# Patient Record
Sex: FEMALE | Race: ASIAN | Employment: UNEMPLOYED | URBAN - METROPOLITAN AREA
[De-identification: names, ages, dates, MRNs, and addresses within clinical notes are randomized per-mention and may not be internally consistent; named-entity substitution may affect disease eponyms.]

---

## 2022-08-20 ENCOUNTER — APPOINTMENT (OUTPATIENT)
Dept: GENERAL RADIOLOGY | Age: 82
DRG: 480 | End: 2022-08-20
Attending: INTERNAL MEDICINE
Payer: COMMERCIAL

## 2022-08-20 ENCOUNTER — HOSPITAL ENCOUNTER (INPATIENT)
Age: 82
LOS: 11 days | Discharge: NURSING, REHAB OR LONG-TERM CARE | DRG: 480 | End: 2022-08-31
Attending: INTERNAL MEDICINE | Admitting: STUDENT IN AN ORGANIZED HEALTH CARE EDUCATION/TRAINING PROGRAM
Payer: COMMERCIAL

## 2022-08-20 DIAGNOSIS — M84.353A FEMORAL NECK STRESS FRACTURE, INITIAL ENCOUNTER: Primary | ICD-10-CM

## 2022-08-20 PROBLEM — S72.002A CLOSED DISPLACED FRACTURE OF LEFT FEMORAL NECK (HCC): Status: ACTIVE | Noted: 2022-08-20

## 2022-08-20 PROBLEM — U07.1 COVID-19: Status: ACTIVE | Noted: 2022-08-20

## 2022-08-20 LAB
ALBUMIN SERPL-MCNC: 4.1 GM/DL (ref 3.4–5)
ALP BLD-CCNC: 82 IU/L (ref 40–129)
ALT SERPL-CCNC: 18 U/L (ref 10–40)
ANION GAP SERPL CALCULATED.3IONS-SCNC: 10 MMOL/L (ref 4–16)
APTT: 37.6 SECONDS (ref 25.1–37.1)
AST SERPL-CCNC: 13 IU/L (ref 15–37)
BASOPHILS ABSOLUTE: 0 K/CU MM
BASOPHILS RELATIVE PERCENT: 0.3 % (ref 0–1)
BILIRUB SERPL-MCNC: 0.5 MG/DL (ref 0–1)
BUN BLDV-MCNC: 19 MG/DL (ref 6–23)
CALCIUM SERPL-MCNC: 10.2 MG/DL (ref 8.3–10.6)
CHLORIDE BLD-SCNC: 94 MMOL/L (ref 99–110)
CO2: 25 MMOL/L (ref 21–32)
CREAT SERPL-MCNC: 0.6 MG/DL (ref 0.6–1.1)
D DIMER: 2223 NG/ML(DDU)
DIFFERENTIAL TYPE: ABNORMAL
EOSINOPHILS ABSOLUTE: 0 K/CU MM
EOSINOPHILS RELATIVE PERCENT: 0 % (ref 0–3)
GFR AFRICAN AMERICAN: >60 ML/MIN/1.73M2
GFR NON-AFRICAN AMERICAN: >60 ML/MIN/1.73M2
GLUCOSE BLD-MCNC: 336 MG/DL (ref 70–99)
GLUCOSE BLD-MCNC: 350 MG/DL (ref 70–99)
HCT VFR BLD CALC: 31.1 % (ref 37–47)
HEMOGLOBIN: 10 GM/DL (ref 12.5–16)
IMMATURE NEUTROPHIL %: 0.3 % (ref 0–0.43)
INR BLD: 0.99 INDEX
LACTATE DEHYDROGENASE: 130 IU/L (ref 120–246)
LYMPHOCYTES ABSOLUTE: 1.7 K/CU MM
LYMPHOCYTES RELATIVE PERCENT: 12.9 % (ref 24–44)
MCH RBC QN AUTO: 27.8 PG (ref 27–31)
MCHC RBC AUTO-ENTMCNC: 32.2 % (ref 32–36)
MCV RBC AUTO: 86.4 FL (ref 78–100)
MONOCYTES ABSOLUTE: 1.2 K/CU MM
MONOCYTES RELATIVE PERCENT: 8.9 % (ref 0–4)
NUCLEATED RBC %: 0 %
PDW BLD-RTO: 14.3 % (ref 11.7–14.9)
PLATELET # BLD: 249 K/CU MM (ref 140–440)
PMV BLD AUTO: 9.5 FL (ref 7.5–11.1)
POTASSIUM SERPL-SCNC: 4.4 MMOL/L (ref 3.5–5.1)
PROTHROMBIN TIME: 12.8 SECONDS (ref 11.7–14.5)
RBC # BLD: 3.6 M/CU MM (ref 4.2–5.4)
SEGMENTED NEUTROPHILS ABSOLUTE COUNT: 10.1 K/CU MM
SEGMENTED NEUTROPHILS RELATIVE PERCENT: 77.6 % (ref 36–66)
SODIUM BLD-SCNC: 129 MMOL/L (ref 135–145)
TOTAL IMMATURE NEUTOROPHIL: 0.04 K/CU MM
TOTAL NUCLEATED RBC: 0 K/CU MM
TOTAL PROTEIN: 6.6 GM/DL (ref 6.4–8.2)
VITAMIN D 25-HYDROXY: 27.31 NG/ML
WBC # BLD: 13.1 K/CU MM (ref 4–10.5)

## 2022-08-20 PROCEDURE — 6370000000 HC RX 637 (ALT 250 FOR IP): Performed by: NURSE PRACTITIONER

## 2022-08-20 PROCEDURE — 83615 LACTATE (LD) (LDH) ENZYME: CPT

## 2022-08-20 PROCEDURE — 85379 FIBRIN DEGRADATION QUANT: CPT

## 2022-08-20 PROCEDURE — 85025 COMPLETE CBC W/AUTO DIFF WBC: CPT

## 2022-08-20 PROCEDURE — 71045 X-RAY EXAM CHEST 1 VIEW: CPT

## 2022-08-20 PROCEDURE — 2580000003 HC RX 258: Performed by: NURSE PRACTITIONER

## 2022-08-20 PROCEDURE — 82306 VITAMIN D 25 HYDROXY: CPT

## 2022-08-20 PROCEDURE — 85730 THROMBOPLASTIN TIME PARTIAL: CPT

## 2022-08-20 PROCEDURE — 80053 COMPREHEN METABOLIC PANEL: CPT

## 2022-08-20 PROCEDURE — 85610 PROTHROMBIN TIME: CPT

## 2022-08-20 PROCEDURE — 6360000002 HC RX W HCPCS: Performed by: NURSE PRACTITIONER

## 2022-08-20 PROCEDURE — 82962 GLUCOSE BLOOD TEST: CPT

## 2022-08-20 PROCEDURE — 83036 HEMOGLOBIN GLYCOSYLATED A1C: CPT

## 2022-08-20 PROCEDURE — 36415 COLL VENOUS BLD VENIPUNCTURE: CPT

## 2022-08-20 PROCEDURE — 1200000000 HC SEMI PRIVATE

## 2022-08-20 RX ORDER — MORPHINE SULFATE 2 MG/ML
2 INJECTION, SOLUTION INTRAMUSCULAR; INTRAVENOUS
Status: DISCONTINUED | OUTPATIENT
Start: 2022-08-20 | End: 2022-08-21

## 2022-08-20 RX ORDER — SODIUM CHLORIDE 0.9 % (FLUSH) 0.9 %
5-40 SYRINGE (ML) INJECTION EVERY 12 HOURS SCHEDULED
Status: DISCONTINUED | OUTPATIENT
Start: 2022-08-20 | End: 2022-08-21

## 2022-08-20 RX ORDER — DEXTROSE AND SODIUM CHLORIDE 5; .45 G/100ML; G/100ML
INJECTION, SOLUTION INTRAVENOUS CONTINUOUS
Status: DISCONTINUED | OUTPATIENT
Start: 2022-08-20 | End: 2022-08-21

## 2022-08-20 RX ORDER — ONDANSETRON 2 MG/ML
4 INJECTION INTRAMUSCULAR; INTRAVENOUS EVERY 6 HOURS PRN
Status: DISCONTINUED | OUTPATIENT
Start: 2022-08-20 | End: 2022-08-21

## 2022-08-20 RX ORDER — INSULIN LISPRO 100 [IU]/ML
0-4 INJECTION, SOLUTION INTRAVENOUS; SUBCUTANEOUS NIGHTLY
Status: DISCONTINUED | OUTPATIENT
Start: 2022-08-20 | End: 2022-08-21

## 2022-08-20 RX ORDER — ENOXAPARIN SODIUM 100 MG/ML
30 INJECTION SUBCUTANEOUS DAILY
Status: DISCONTINUED | OUTPATIENT
Start: 2022-08-20 | End: 2022-08-20

## 2022-08-20 RX ORDER — SODIUM CHLORIDE 0.9 % (FLUSH) 0.9 %
5-40 SYRINGE (ML) INJECTION PRN
Status: DISCONTINUED | OUTPATIENT
Start: 2022-08-20 | End: 2022-08-31 | Stop reason: HOSPADM

## 2022-08-20 RX ORDER — ACETAMINOPHEN 650 MG/1
650 SUPPOSITORY RECTAL EVERY 6 HOURS PRN
Status: DISCONTINUED | OUTPATIENT
Start: 2022-08-20 | End: 2022-08-21

## 2022-08-20 RX ORDER — DEXTROSE MONOHYDRATE 100 MG/ML
INJECTION, SOLUTION INTRAVENOUS CONTINUOUS PRN
Status: DISCONTINUED | OUTPATIENT
Start: 2022-08-20 | End: 2022-08-31 | Stop reason: HOSPADM

## 2022-08-20 RX ORDER — METOPROLOL SUCCINATE 25 MG/1
25 TABLET, EXTENDED RELEASE ORAL DAILY
COMMUNITY

## 2022-08-20 RX ORDER — IBUPROFEN 400 MG/1
400 TABLET ORAL EVERY 12 HOURS PRN
Status: DISCONTINUED | OUTPATIENT
Start: 2022-08-20 | End: 2022-08-21

## 2022-08-20 RX ORDER — PROMETHAZINE HYDROCHLORIDE 25 MG/1
12.5 TABLET ORAL EVERY 6 HOURS PRN
Status: DISCONTINUED | OUTPATIENT
Start: 2022-08-20 | End: 2022-08-21

## 2022-08-20 RX ORDER — INSULIN LISPRO 100 [IU]/ML
0-4 INJECTION, SOLUTION INTRAVENOUS; SUBCUTANEOUS
Status: DISCONTINUED | OUTPATIENT
Start: 2022-08-20 | End: 2022-08-21

## 2022-08-20 RX ORDER — ACETAMINOPHEN 325 MG/1
650 TABLET ORAL EVERY 6 HOURS PRN
Status: DISCONTINUED | OUTPATIENT
Start: 2022-08-20 | End: 2022-08-21

## 2022-08-20 RX ORDER — GUAIFENESIN/DEXTROMETHORPHAN 100-10MG/5
5 SYRUP ORAL EVERY 4 HOURS PRN
Status: DISCONTINUED | OUTPATIENT
Start: 2022-08-20 | End: 2022-08-31 | Stop reason: HOSPADM

## 2022-08-20 RX ORDER — METOPROLOL SUCCINATE 25 MG/1
25 TABLET, EXTENDED RELEASE ORAL EVERY EVENING
Status: DISCONTINUED | OUTPATIENT
Start: 2022-08-20 | End: 2022-08-31 | Stop reason: HOSPADM

## 2022-08-20 RX ORDER — POLYETHYLENE GLYCOL 3350 17 G/17G
17 POWDER, FOR SOLUTION ORAL DAILY PRN
Status: DISCONTINUED | OUTPATIENT
Start: 2022-08-20 | End: 2022-08-31 | Stop reason: HOSPADM

## 2022-08-20 RX ORDER — MORPHINE SULFATE 4 MG/ML
4 INJECTION, SOLUTION INTRAMUSCULAR; INTRAVENOUS
Status: DISCONTINUED | OUTPATIENT
Start: 2022-08-20 | End: 2022-08-21

## 2022-08-20 RX ORDER — LISINOPRIL 2.5 MG/1
5 TABLET ORAL 2 TIMES DAILY
Status: ON HOLD | COMMUNITY
End: 2022-08-31 | Stop reason: SDUPTHER

## 2022-08-20 RX ORDER — AMLODIPINE BESYLATE 5 MG/1
5 TABLET ORAL DAILY
Status: DISCONTINUED | OUTPATIENT
Start: 2022-08-21 | End: 2022-08-31 | Stop reason: HOSPADM

## 2022-08-20 RX ORDER — AMLODIPINE BESYLATE 5 MG/1
10 TABLET ORAL DAILY
Status: ON HOLD | COMMUNITY
End: 2022-08-31 | Stop reason: SDUPTHER

## 2022-08-20 RX ORDER — ENOXAPARIN SODIUM 100 MG/ML
30 INJECTION SUBCUTANEOUS DAILY
Status: DISCONTINUED | OUTPATIENT
Start: 2022-08-21 | End: 2022-08-21

## 2022-08-20 RX ORDER — SODIUM CHLORIDE 9 MG/ML
INJECTION, SOLUTION INTRAVENOUS PRN
Status: DISCONTINUED | OUTPATIENT
Start: 2022-08-20 | End: 2022-08-31 | Stop reason: HOSPADM

## 2022-08-20 RX ORDER — GLIPIZIDE 10 MG/1
60 TABLET ORAL
Status: ON HOLD | COMMUNITY
End: 2022-08-21 | Stop reason: CLARIF

## 2022-08-20 RX ORDER — LISINOPRIL 5 MG/1
5 TABLET ORAL DAILY
Status: DISCONTINUED | OUTPATIENT
Start: 2022-08-21 | End: 2022-08-31 | Stop reason: HOSPADM

## 2022-08-20 RX ADMIN — MORPHINE SULFATE 2 MG: 2 INJECTION, SOLUTION INTRAMUSCULAR; INTRAVENOUS at 23:35

## 2022-08-20 RX ADMIN — DEXTROSE AND SODIUM CHLORIDE: 5; 450 INJECTION, SOLUTION INTRAVENOUS at 23:49

## 2022-08-20 RX ADMIN — INSULIN LISPRO 4 UNITS: 100 INJECTION, SOLUTION INTRAVENOUS; SUBCUTANEOUS at 21:59

## 2022-08-20 RX ADMIN — SODIUM CHLORIDE, PRESERVATIVE FREE 10 ML: 5 INJECTION INTRAVENOUS at 22:00

## 2022-08-20 ASSESSMENT — ENCOUNTER SYMPTOMS
BACK PAIN: 1
SHORTNESS OF BREATH: 0
VOMITING: 0
EYE REDNESS: 0
COUGH: 0
DIARRHEA: 0
NAUSEA: 0

## 2022-08-20 ASSESSMENT — PAIN DESCRIPTION - ORIENTATION
ORIENTATION: LEFT
ORIENTATION: LEFT

## 2022-08-20 ASSESSMENT — PAIN DESCRIPTION - PAIN TYPE: TYPE: CHRONIC PAIN;ACUTE PAIN

## 2022-08-20 ASSESSMENT — PAIN SCALES - GENERAL
PAINLEVEL_OUTOF10: 4
PAINLEVEL_OUTOF10: 6

## 2022-08-20 ASSESSMENT — PAIN DESCRIPTION - DESCRIPTORS
DESCRIPTORS: ACHING
DESCRIPTORS: ACHING

## 2022-08-20 ASSESSMENT — PAIN DESCRIPTION - LOCATION
LOCATION: HIP
LOCATION: GENERALIZED;HIP

## 2022-08-20 ASSESSMENT — PAIN - FUNCTIONAL ASSESSMENT: PAIN_FUNCTIONAL_ASSESSMENT: PREVENTS OR INTERFERES SOME ACTIVE ACTIVITIES AND ADLS

## 2022-08-20 NOTE — PROGRESS NOTES
Pt transferred to room 2016. VSS. Pt placed in gown. Family at bedside. Call from [place] [phone] regarding surgery tomorrow with Dr Marta Houser. Dr Marta Houser placed on pt care team. Asked Dr Aramis Johnson for consult to pulmonology per family request. Also asked for consult for ortho surgery for Dr Marta Houser. Pt had dietary preference. No gelatin or pork. Dietary made aware. Updated in chart.

## 2022-08-20 NOTE — H&P
V2.0  History and Physical      Name:  Carlotta Patrick /Age/Sex: 1940  (81 y.o. female)   MRN & CSN:  [id number] & [id number] Encounter Date/Time: 2022 6:11 PM EDT   Location:  -A PCP: Luly Branham MD       Hospital Day: 1    Assessment and Plan:   Carlotta Patrick is a 81 y.o. female with a pmh as noted below presents with left femur neck fracture     Acute fracture of left femoral neck  Fall at home, initial encounter   Head CT, C-spine with contrast at [place] ED negative for acute intracranial findings. C-spine negative for acute cervical fracture. X-ray left hip shows fracture of left surgical neck of the femur with extension into the greater trochanter and mild shortening   Left elbow x-rays at [place] ED were negative for acute fracture dislocation   Seen on x-ray at [place] ED. Patient n.p.o. at midnight for surgery in a.m. with Dr. Madeline Walls   Start IV fluids at midnight   Pain control PRN   Antiemetics PRN   Case management consult       COVID 19     -Incidental finding, asymptomatic   -Portable CXR pending    -Wean oxygen as tolerated   -Albuterol as needed for wheezing   Daily CBC, CMP, CRP, INR, D-dimer, procalcitonin q48hr   Telemetry and continuous pulse ox   Droplet plus precaution   -Patient does not require respiratory support with oxygen so no need for steroids at this time   -Consult to Dr. Yves Araya placed per family request.    Essential hypertension   Continue home Norvasc, lisinopril, Toprol-XL    Non-insulin-dependent type 2 diabetes mellitus   Hold oral antihyperglycemic's, sliding scale insulin with hypoglycemia protocol      Admission labs pending, ordered stat, at time of H&P   Chronic Conditions: continue home medication as ordered    All testing  and results reviewed with patient . All questions answered. Patient and family voiced understanding    This patient was seen and examined in conjunction with Dr. David Coombs.  He/She was agreeable with the plan and management as dictated above. Disposition:   Expected Disposition: Unclear  Estimated D/C: 3 days     Diet Diet NPO  ADULT ORAL NUTRITION SUPPLEMENT; AM Snack, HS Snack; Wound Healing Oral Supplement  ADULT DIET; Regular; 5 carb choices (75 gm/meal)   GI Prophylaxis  [] PPI,  [] H2 Blocker,  [] Carafate,  [x] Diet/Tube Feeds   DVT Prophylaxis [x] Lovenox, []  Heparin, [x] SCDs, [] Ambulation,  [] NOAC   Code Status Full Code   Surrogate Decision Maker/ POA          History from:     patient, electronic medical record,     History of Present Illness:     Chief Complaint: COVID-19  Dustin Spain is a 81 y.o. female with past medical history of essential hypertension, diabetes who presented to the emergency department at [facility] after mechanical fall. She reportedly was going to the bathroom this morning at 2 around 2 AM she fell. She hit her head but did not not lose consciousness. She denies any blood thinners. She denies neck pain. .  Reports left hip pain. Denies nausea vomiting. Denies lightheadedness or dizziness. Lab work-up at [facility] ED was remarkable for a glucose of 374 sodium 133 hemoglobin 9 point select with hematocrit of 28.5. Rapid COVID was also found to be positive. X-ray at [facility] showed a fracture of the left surgical neck of the femur with extension into the greater trochanter and mild shortening. CT head without contrast in [facility] ED was negative for acute intracranial findings     Review of Systems: Need 10 Elements   Review of Systems   Constitutional:  Negative for activity change, appetite change, diaphoresis and fatigue. HENT:  Negative for congestion and postnasal drip. Eyes:  Negative for redness and visual disturbance. Respiratory:  Negative for cough and shortness of breath. Cardiovascular:  Negative for chest pain and palpitations. Gastrointestinal:  Negative for diarrhea, nausea and vomiting.    Endocrine: Negative for cold intolerance and heat intolerance. Genitourinary:  Negative for difficulty urinating and dysuria. Musculoskeletal:  Positive for back pain. Negative for joint swelling and neck pain. Left hip pain   Skin: Negative. Neurological:  Negative for dizziness and speech difficulty. Psychiatric/Behavioral:  Negative for agitation and confusion. Objective:   No intake or output data in the 24 hours ending 08/20/22 1846   Vitals:   Vitals:    08/20/22 1803   BP: 127/79   Pulse: (!) 111   Resp: 15   Temp: 98 °F (36.7 °C)   TempSrc: Oral   SpO2: 98%       Medications Prior to Admission     Prior to Admission medications    Medication Sig Start Date End Date Taking? Authorizing Provider   amLODIPine (NORVASC) 5 MG tablet Take 5 mg by mouth daily   Yes Historical Provider, MD   metoprolol succinate (TOPROL XL) 25 MG extended release tablet Take 25 mg by mouth daily   Yes Historical Provider, MD   SITagliptin (JANUVIA) 50 MG tablet Take 50 mg by mouth daily   Yes Historical Provider, MD   glipiZIDE (GLUCOTROL) 10 MG tablet Take 60 mg by mouth   Yes Historical Provider, MD   lisinopril (PRINIVIL;ZESTRIL) 2.5 MG tablet Take 5 mg by mouth daily    Historical Provider, MD       Physical Exam: Need 8 Elements   Physical Exam  Vitals and nursing note reviewed. Constitutional:       General: She is not in acute distress. Appearance: Normal appearance. HENT:      Head: Normocephalic. Nose: Nose normal.      Mouth/Throat:      Pharynx: Oropharynx is clear. Eyes:      Pupils: Pupils are equal, round, and reactive to light. Cardiovascular:      Rate and Rhythm: Normal rate and regular rhythm. Pulses: Normal pulses. Pulmonary:      Effort: Pulmonary effort is normal. No respiratory distress. Breath sounds: No wheezing or rhonchi. Abdominal:      General: Abdomen is flat. Bowel sounds are normal. There is no distension. Musculoskeletal:         General: Normal range of motion.       Cervical back: Normal range of motion. Left hip: Deformity and tenderness present. Comments: Left hip shortened and internally rotated. .  Brace applied to left leg/knee, fracture precautions in place   Skin:     General: Skin is warm and dry. Capillary Refill: Capillary refill takes less than 2 seconds. Neurological:      General: No focal deficit present. Mental Status: She is alert and oriented to person, place, and time. Psychiatric:         Mood and Affect: Mood normal.          Past Medical History:   PMHx Essential Hypertension, NIDDM   PSHX:  has no past surgical history on file. Allergies: Allergies   Allergen Reactions    Aspirin Anaphylaxis     Fam HX:  Family history of hypertension, DM  Soc HX:   Social History     Socioeconomic History    Marital status:        Medications:   Medications:    Infusions:   PRN Meds:     Labs      CBC: No results for input(s): WBC, HGB, PLT in the last 72 hours. BMP:  No results for input(s): NA, K, CL, CO2, BUN, CREATININE, GLUCOSE in the last 72 hours. Hepatic: No results for input(s): AST, ALT, ALB, BILITOT, ALKPHOS in the last 72 hours. Lipids: No results found for: CHOL, HDL, TRIG  Hemoglobin A1C: No results found for: LABA1C  TSH: No results found for: TSH  Troponin: No results found for: TROPONINT  Lactic Acid: No results for input(s): LACTA in the last 72 hours. BNP: No results for input(s): PROBNP in the last 72 hours. UA:No results found for: Amber Ortiz, PHUR, LABCAST, WBCUA, RBCUA, MUCUS, TRICHOMONAS, YEAST, BACTERIA, CLARITYU, SPECGRAV, LEUKOCYTESUR, UROBILINOGEN, BILIRUBINUR, BLOODU, GLUCOSEU, KETUA, AMORPHOUS  Urine Cultures: No results found for: LABURIN  Blood Cultures: No results found for: BC  No results found for: BLOODCULT2  Organism: No results found for: ORG    Imaging/Diagnostics Last 24 Hours   No results found.           Electronically signed by JACQUELINE Tobin CNP on 8/20/2022 at 6:46 PM          This dictation was created with voice recognition software. While attempts have been made to review the dictation as it is transcribed, on occasion the spoken word can be misinterpreted by the technology leading to omissions or inappropriate words, phrases or sentences.      Electronically signed by JACQUELINE Lyons CNP on 8/20/2022 at 6:46 PM

## 2022-08-21 ENCOUNTER — ANESTHESIA EVENT (OUTPATIENT)
Dept: OPERATING ROOM | Age: 82
DRG: 480 | End: 2022-08-21
Payer: COMMERCIAL

## 2022-08-21 ENCOUNTER — APPOINTMENT (OUTPATIENT)
Dept: GENERAL RADIOLOGY | Age: 82
DRG: 480 | End: 2022-08-21
Attending: INTERNAL MEDICINE
Payer: COMMERCIAL

## 2022-08-21 ENCOUNTER — ANESTHESIA (OUTPATIENT)
Dept: OPERATING ROOM | Age: 82
DRG: 480 | End: 2022-08-21
Payer: COMMERCIAL

## 2022-08-21 PROBLEM — M84.353A FEMORAL NECK STRESS FRACTURE, INITIAL ENCOUNTER: Status: ACTIVE | Noted: 2022-08-21

## 2022-08-21 LAB
ALBUMIN SERPL-MCNC: 3.9 GM/DL (ref 3.4–5)
ALP BLD-CCNC: 74 IU/L (ref 40–128)
ALT SERPL-CCNC: 16 U/L (ref 10–40)
ANION GAP SERPL CALCULATED.3IONS-SCNC: 9 MMOL/L (ref 4–16)
AST SERPL-CCNC: 13 IU/L (ref 15–37)
BASOPHILS ABSOLUTE: 0 K/CU MM
BASOPHILS RELATIVE PERCENT: 0.3 % (ref 0–1)
BILIRUB SERPL-MCNC: 0.5 MG/DL (ref 0–1)
BUN BLDV-MCNC: 17 MG/DL (ref 6–23)
C-REACTIVE PROTEIN, HIGH SENSITIVITY: 74.9 MG/L
CALCIUM SERPL-MCNC: 9.9 MG/DL (ref 8.3–10.6)
CHLORIDE BLD-SCNC: 97 MMOL/L (ref 99–110)
CO2: 26 MMOL/L (ref 21–32)
CREAT SERPL-MCNC: 0.6 MG/DL (ref 0.6–1.1)
D DIMER: 1515 NG/ML(DDU)
DIFFERENTIAL TYPE: ABNORMAL
EOSINOPHILS ABSOLUTE: 0 K/CU MM
EOSINOPHILS RELATIVE PERCENT: 0.2 % (ref 0–3)
ESTIMATED AVERAGE GLUCOSE: 174 MG/DL
GFR AFRICAN AMERICAN: >60 ML/MIN/1.73M2
GFR NON-AFRICAN AMERICAN: >60 ML/MIN/1.73M2
GLUCOSE BLD-MCNC: 244 MG/DL (ref 70–99)
GLUCOSE BLD-MCNC: 260 MG/DL (ref 70–99)
GLUCOSE BLD-MCNC: 304 MG/DL (ref 70–99)
GLUCOSE BLD-MCNC: 305 MG/DL (ref 70–99)
GLUCOSE BLD-MCNC: 327 MG/DL (ref 70–99)
GLUCOSE BLD-MCNC: 389 MG/DL (ref 70–99)
HBA1C MFR BLD: 7.7 % (ref 4.2–6.3)
HCT VFR BLD CALC: 30.3 % (ref 37–47)
HEMOGLOBIN: 9.7 GM/DL (ref 12.5–16)
IMMATURE NEUTROPHIL %: 0.3 % (ref 0–0.43)
INR BLD: 0.96 INDEX
LYMPHOCYTES ABSOLUTE: 1.7 K/CU MM
LYMPHOCYTES RELATIVE PERCENT: 16.8 % (ref 24–44)
MCH RBC QN AUTO: 27.6 PG (ref 27–31)
MCHC RBC AUTO-ENTMCNC: 32 % (ref 32–36)
MCV RBC AUTO: 86.1 FL (ref 78–100)
MONOCYTES ABSOLUTE: 0.9 K/CU MM
MONOCYTES RELATIVE PERCENT: 8.9 % (ref 0–4)
NUCLEATED RBC %: 0 %
PDW BLD-RTO: 14.4 % (ref 11.7–14.9)
PLATELET # BLD: 229 K/CU MM (ref 140–440)
PMV BLD AUTO: 9.9 FL (ref 7.5–11.1)
POTASSIUM SERPL-SCNC: 4.1 MMOL/L (ref 3.5–5.1)
PROCALCITONIN: 0.45
PROTHROMBIN TIME: 12.4 SECONDS (ref 11.7–14.5)
RBC # BLD: 3.52 M/CU MM (ref 4.2–5.4)
SEGMENTED NEUTROPHILS ABSOLUTE COUNT: 7.5 K/CU MM
SEGMENTED NEUTROPHILS RELATIVE PERCENT: 73.5 % (ref 36–66)
SODIUM BLD-SCNC: 132 MMOL/L (ref 135–145)
TOTAL IMMATURE NEUTOROPHIL: 0.03 K/CU MM
TOTAL NUCLEATED RBC: 0 K/CU MM
TOTAL PROTEIN: 6.3 GM/DL (ref 6.4–8.2)
VITAMIN D 25-HYDROXY: 27.37 NG/ML
WBC # BLD: 10.2 K/CU MM (ref 4–10.5)

## 2022-08-21 PROCEDURE — 3700000000 HC ANESTHESIA ATTENDED CARE: Performed by: ORTHOPAEDIC SURGERY

## 2022-08-21 PROCEDURE — 6370000000 HC RX 637 (ALT 250 FOR IP): Performed by: ORTHOPAEDIC SURGERY

## 2022-08-21 PROCEDURE — 6360000002 HC RX W HCPCS: Performed by: NURSE PRACTITIONER

## 2022-08-21 PROCEDURE — 1200000000 HC SEMI PRIVATE

## 2022-08-21 PROCEDURE — 0QS736Z REPOSITION LEFT UPPER FEMUR WITH INTRAMEDULLARY INTERNAL FIXATION DEVICE, PERCUTANEOUS APPROACH: ICD-10-PCS | Performed by: UROLOGY

## 2022-08-21 PROCEDURE — 6370000000 HC RX 637 (ALT 250 FOR IP): Performed by: STUDENT IN AN ORGANIZED HEALTH CARE EDUCATION/TRAINING PROGRAM

## 2022-08-21 PROCEDURE — 3600000004 HC SURGERY LEVEL 4 BASE: Performed by: ORTHOPAEDIC SURGERY

## 2022-08-21 PROCEDURE — 82962 GLUCOSE BLOOD TEST: CPT

## 2022-08-21 PROCEDURE — 85610 PROTHROMBIN TIME: CPT

## 2022-08-21 PROCEDURE — 85379 FIBRIN DEGRADATION QUANT: CPT

## 2022-08-21 PROCEDURE — 36415 COLL VENOUS BLD VENIPUNCTURE: CPT

## 2022-08-21 PROCEDURE — 94761 N-INVAS EAR/PLS OXIMETRY MLT: CPT

## 2022-08-21 PROCEDURE — 82306 VITAMIN D 25 HYDROXY: CPT

## 2022-08-21 PROCEDURE — 6360000002 HC RX W HCPCS: Performed by: ORTHOPAEDIC SURGERY

## 2022-08-21 PROCEDURE — 85025 COMPLETE CBC W/AUTO DIFF WBC: CPT

## 2022-08-21 PROCEDURE — 2580000003 HC RX 258: Performed by: STUDENT IN AN ORGANIZED HEALTH CARE EDUCATION/TRAINING PROGRAM

## 2022-08-21 PROCEDURE — 2709999900 HC NON-CHARGEABLE SUPPLY: Performed by: ORTHOPAEDIC SURGERY

## 2022-08-21 PROCEDURE — 2580000003 HC RX 258: Performed by: ORTHOPAEDIC SURGERY

## 2022-08-21 PROCEDURE — 86140 C-REACTIVE PROTEIN: CPT

## 2022-08-21 PROCEDURE — 76000 FLUOROSCOPY <1 HR PHYS/QHP: CPT

## 2022-08-21 PROCEDURE — 84145 PROCALCITONIN (PCT): CPT

## 2022-08-21 PROCEDURE — C1713 ANCHOR/SCREW BN/BN,TIS/BN: HCPCS | Performed by: ORTHOPAEDIC SURGERY

## 2022-08-21 PROCEDURE — 6360000002 HC RX W HCPCS

## 2022-08-21 PROCEDURE — 73502 X-RAY EXAM HIP UNI 2-3 VIEWS: CPT

## 2022-08-21 PROCEDURE — 80053 COMPREHEN METABOLIC PANEL: CPT

## 2022-08-21 PROCEDURE — 6370000000 HC RX 637 (ALT 250 FOR IP): Performed by: NURSE PRACTITIONER

## 2022-08-21 PROCEDURE — 7100000000 HC PACU RECOVERY - FIRST 15 MIN: Performed by: ORTHOPAEDIC SURGERY

## 2022-08-21 PROCEDURE — 3600000014 HC SURGERY LEVEL 4 ADDTL 15MIN: Performed by: ORTHOPAEDIC SURGERY

## 2022-08-21 PROCEDURE — 2580000003 HC RX 258: Performed by: ANESTHESIOLOGY

## 2022-08-21 PROCEDURE — 2720000010 HC SURG SUPPLY STERILE: Performed by: ORTHOPAEDIC SURGERY

## 2022-08-21 PROCEDURE — 2780000010 HC IMPLANT OTHER: Performed by: ORTHOPAEDIC SURGERY

## 2022-08-21 PROCEDURE — 2060000000 HC ICU INTERMEDIATE R&B

## 2022-08-21 PROCEDURE — 3700000001 HC ADD 15 MINUTES (ANESTHESIA): Performed by: ORTHOPAEDIC SURGERY

## 2022-08-21 PROCEDURE — 2500000003 HC RX 250 WO HCPCS

## 2022-08-21 DEVICE — SCREW BNE L40MM DIA5MM ST TIB LT GRN TI ST CANN LOK FULL: Type: IMPLANTABLE DEVICE | Site: HIP | Status: FUNCTIONAL

## 2022-08-21 DEVICE — IMPLANTABLE DEVICE: Type: IMPLANTABLE DEVICE | Site: HIP | Status: FUNCTIONAL

## 2022-08-21 DEVICE — NAIL IM L360MM DIA11MM 130DEG LNG L PROX FEM GRN TI CANN: Type: IMPLANTABLE DEVICE | Site: HIP | Status: FUNCTIONAL

## 2022-08-21 RX ORDER — SODIUM CHLORIDE 0.9 % (FLUSH) 0.9 %
5-40 SYRINGE (ML) INJECTION PRN
Status: DISCONTINUED | OUTPATIENT
Start: 2022-08-21 | End: 2022-08-31 | Stop reason: HOSPADM

## 2022-08-21 RX ORDER — FENTANYL CITRATE 50 UG/ML
INJECTION, SOLUTION INTRAMUSCULAR; INTRAVENOUS PRN
Status: DISCONTINUED | OUTPATIENT
Start: 2022-08-21 | End: 2022-08-21

## 2022-08-21 RX ORDER — IPRATROPIUM BROMIDE AND ALBUTEROL SULFATE 2.5; .5 MG/3ML; MG/3ML
1 SOLUTION RESPIRATORY (INHALATION)
Status: DISCONTINUED | OUTPATIENT
Start: 2022-08-21 | End: 2022-08-21 | Stop reason: HOSPADM

## 2022-08-21 RX ORDER — METHYLPREDNISOLONE 4 MG/1
6 TABLET ORAL DAILY
Status: DISCONTINUED | OUTPATIENT
Start: 2022-08-21 | End: 2022-08-22

## 2022-08-21 RX ORDER — CEFAZOLIN SODIUM 1 G/3ML
INJECTION, POWDER, FOR SOLUTION INTRAMUSCULAR; INTRAVENOUS PRN
Status: DISCONTINUED | OUTPATIENT
Start: 2022-08-21 | End: 2022-08-21 | Stop reason: SDUPTHER

## 2022-08-21 RX ORDER — HYDRALAZINE HYDROCHLORIDE 20 MG/ML
10 INJECTION INTRAMUSCULAR; INTRAVENOUS
Status: DISCONTINUED | OUTPATIENT
Start: 2022-08-21 | End: 2022-08-21 | Stop reason: HOSPADM

## 2022-08-21 RX ORDER — INSULIN LISPRO 100 [IU]/ML
3 INJECTION, SOLUTION INTRAVENOUS; SUBCUTANEOUS ONCE
Status: COMPLETED | OUTPATIENT
Start: 2022-08-21 | End: 2022-08-21

## 2022-08-21 RX ORDER — ACETAMINOPHEN 325 MG/1
650 TABLET ORAL EVERY 6 HOURS
Status: DISCONTINUED | OUTPATIENT
Start: 2022-08-21 | End: 2022-08-31 | Stop reason: HOSPADM

## 2022-08-21 RX ORDER — DIPHENHYDRAMINE HYDROCHLORIDE 50 MG/ML
12.5 INJECTION INTRAMUSCULAR; INTRAVENOUS
Status: DISCONTINUED | OUTPATIENT
Start: 2022-08-21 | End: 2022-08-21 | Stop reason: HOSPADM

## 2022-08-21 RX ORDER — SODIUM CHLORIDE, SODIUM LACTATE, POTASSIUM CHLORIDE, CALCIUM CHLORIDE 600; 310; 30; 20 MG/100ML; MG/100ML; MG/100ML; MG/100ML
INJECTION, SOLUTION INTRAVENOUS CONTINUOUS
Status: DISCONTINUED | OUTPATIENT
Start: 2022-08-21 | End: 2022-08-21

## 2022-08-21 RX ORDER — OXYCODONE HYDROCHLORIDE 10 MG/1
10 TABLET ORAL EVERY 4 HOURS PRN
Status: DISCONTINUED | OUTPATIENT
Start: 2022-08-21 | End: 2022-08-31 | Stop reason: HOSPADM

## 2022-08-21 RX ORDER — INSULIN LISPRO 100 [IU]/ML
0-4 INJECTION, SOLUTION INTRAVENOUS; SUBCUTANEOUS NIGHTLY
Status: DISCONTINUED | OUTPATIENT
Start: 2022-08-21 | End: 2022-08-22

## 2022-08-21 RX ORDER — OXYCODONE HYDROCHLORIDE 5 MG/1
5 TABLET ORAL EVERY 4 HOURS PRN
Status: DISCONTINUED | OUTPATIENT
Start: 2022-08-21 | End: 2022-08-31 | Stop reason: HOSPADM

## 2022-08-21 RX ORDER — PROCHLORPERAZINE EDISYLATE 5 MG/ML
5 INJECTION INTRAMUSCULAR; INTRAVENOUS
Status: DISCONTINUED | OUTPATIENT
Start: 2022-08-21 | End: 2022-08-21 | Stop reason: HOSPADM

## 2022-08-21 RX ORDER — MEPERIDINE HYDROCHLORIDE 25 MG/ML
12.5 INJECTION INTRAMUSCULAR; INTRAVENOUS; SUBCUTANEOUS ONCE
Status: DISCONTINUED | OUTPATIENT
Start: 2022-08-21 | End: 2022-08-21 | Stop reason: HOSPADM

## 2022-08-21 RX ORDER — SODIUM CHLORIDE 9 MG/ML
INJECTION, SOLUTION INTRAVENOUS CONTINUOUS
Status: DISCONTINUED | OUTPATIENT
Start: 2022-08-21 | End: 2022-08-21

## 2022-08-21 RX ORDER — LIDOCAINE HYDROCHLORIDE 20 MG/ML
INJECTION, SOLUTION INTRAVENOUS PRN
Status: DISCONTINUED | OUTPATIENT
Start: 2022-08-21 | End: 2022-08-21 | Stop reason: SDUPTHER

## 2022-08-21 RX ORDER — ONDANSETRON 2 MG/ML
4 INJECTION INTRAMUSCULAR; INTRAVENOUS
Status: DISCONTINUED | OUTPATIENT
Start: 2022-08-21 | End: 2022-08-21 | Stop reason: HOSPADM

## 2022-08-21 RX ORDER — INSULIN LISPRO 100 [IU]/ML
0-8 INJECTION, SOLUTION INTRAVENOUS; SUBCUTANEOUS
Status: DISCONTINUED | OUTPATIENT
Start: 2022-08-21 | End: 2022-08-22

## 2022-08-21 RX ORDER — SODIUM CHLORIDE 0.9 % (FLUSH) 0.9 %
5-40 SYRINGE (ML) INJECTION EVERY 12 HOURS SCHEDULED
Status: DISCONTINUED | OUTPATIENT
Start: 2022-08-21 | End: 2022-08-31 | Stop reason: HOSPADM

## 2022-08-21 RX ORDER — SODIUM CHLORIDE 9 MG/ML
25 INJECTION, SOLUTION INTRAVENOUS PRN
Status: DISCONTINUED | OUTPATIENT
Start: 2022-08-21 | End: 2022-08-31 | Stop reason: HOSPADM

## 2022-08-21 RX ORDER — FENTANYL CITRATE 50 UG/ML
50 INJECTION, SOLUTION INTRAMUSCULAR; INTRAVENOUS EVERY 5 MIN PRN
Status: DISCONTINUED | OUTPATIENT
Start: 2022-08-21 | End: 2022-08-21 | Stop reason: HOSPADM

## 2022-08-21 RX ORDER — FENTANYL CITRATE 50 UG/ML
INJECTION, SOLUTION INTRAMUSCULAR; INTRAVENOUS PRN
Status: DISCONTINUED | OUTPATIENT
Start: 2022-08-21 | End: 2022-08-21 | Stop reason: SDUPTHER

## 2022-08-21 RX ORDER — ONDANSETRON 2 MG/ML
INJECTION INTRAMUSCULAR; INTRAVENOUS PRN
Status: DISCONTINUED | OUTPATIENT
Start: 2022-08-21 | End: 2022-08-21 | Stop reason: SDUPTHER

## 2022-08-21 RX ORDER — PROPOFOL 10 MG/ML
INJECTION, EMULSION INTRAVENOUS PRN
Status: DISCONTINUED | OUTPATIENT
Start: 2022-08-21 | End: 2022-08-21 | Stop reason: SDUPTHER

## 2022-08-21 RX ORDER — INSULIN LISPRO 100 [IU]/ML
2 INJECTION, SOLUTION INTRAVENOUS; SUBCUTANEOUS ONCE
Status: DISCONTINUED | OUTPATIENT
Start: 2022-08-21 | End: 2022-08-21

## 2022-08-21 RX ORDER — DEXAMETHASONE SODIUM PHOSPHATE 4 MG/ML
INJECTION, SOLUTION INTRA-ARTICULAR; INTRALESIONAL; INTRAMUSCULAR; INTRAVENOUS; SOFT TISSUE PRN
Status: DISCONTINUED | OUTPATIENT
Start: 2022-08-21 | End: 2022-08-21 | Stop reason: SDUPTHER

## 2022-08-21 RX ORDER — SODIUM CHLORIDE 9 MG/ML
INJECTION, SOLUTION INTRAVENOUS PRN
Status: DISCONTINUED | OUTPATIENT
Start: 2022-08-21 | End: 2022-08-31 | Stop reason: HOSPADM

## 2022-08-21 RX ORDER — SUCCINYLCHOLINE/SOD CL,ISO/PF 100 MG/5ML
SYRINGE (ML) INTRAVENOUS PRN
Status: DISCONTINUED | OUTPATIENT
Start: 2022-08-21 | End: 2022-08-21 | Stop reason: SDUPTHER

## 2022-08-21 RX ORDER — LORAZEPAM 2 MG/ML
0.5 INJECTION INTRAMUSCULAR
Status: DISCONTINUED | OUTPATIENT
Start: 2022-08-21 | End: 2022-08-21 | Stop reason: HOSPADM

## 2022-08-21 RX ORDER — DEXTROSE MONOHYDRATE 100 MG/ML
INJECTION, SOLUTION INTRAVENOUS CONTINUOUS PRN
Status: DISCONTINUED | OUTPATIENT
Start: 2022-08-21 | End: 2022-08-21 | Stop reason: HOSPADM

## 2022-08-21 RX ORDER — OXYCODONE HYDROCHLORIDE 5 MG/1
5 TABLET ORAL
Status: DISCONTINUED | OUTPATIENT
Start: 2022-08-21 | End: 2022-08-21 | Stop reason: HOSPADM

## 2022-08-21 RX ORDER — ROCURONIUM BROMIDE 10 MG/ML
INJECTION, SOLUTION INTRAVENOUS PRN
Status: DISCONTINUED | OUTPATIENT
Start: 2022-08-21 | End: 2022-08-21 | Stop reason: SDUPTHER

## 2022-08-21 RX ORDER — ONDANSETRON 2 MG/ML
4 INJECTION INTRAMUSCULAR; INTRAVENOUS EVERY 6 HOURS PRN
Status: DISCONTINUED | OUTPATIENT
Start: 2022-08-21 | End: 2022-08-31 | Stop reason: HOSPADM

## 2022-08-21 RX ORDER — ONDANSETRON 4 MG/1
4 TABLET, ORALLY DISINTEGRATING ORAL EVERY 8 HOURS PRN
Status: DISCONTINUED | OUTPATIENT
Start: 2022-08-21 | End: 2022-08-31 | Stop reason: HOSPADM

## 2022-08-21 RX ORDER — ENOXAPARIN SODIUM 100 MG/ML
30 INJECTION SUBCUTANEOUS 2 TIMES DAILY
Status: DISCONTINUED | OUTPATIENT
Start: 2022-08-21 | End: 2022-08-31 | Stop reason: HOSPADM

## 2022-08-21 RX ADMIN — BARICITINIB 4 MG: 2 TABLET, FILM COATED ORAL at 17:24

## 2022-08-21 RX ADMIN — METHYLPREDNISOLONE 6 MG: 4 TABLET ORAL at 17:23

## 2022-08-21 RX ADMIN — ONDANSETRON 4 MG: 2 INJECTION INTRAMUSCULAR; INTRAVENOUS at 11:58

## 2022-08-21 RX ADMIN — ACETAMINOPHEN 650 MG: 325 TABLET ORAL at 17:23

## 2022-08-21 RX ADMIN — LISINOPRIL 5 MG: 5 TABLET ORAL at 14:24

## 2022-08-21 RX ADMIN — SODIUM CHLORIDE 25 ML: 9 INJECTION, SOLUTION INTRAVENOUS at 20:38

## 2022-08-21 RX ADMIN — Medication 10 ML: at 20:23

## 2022-08-21 RX ADMIN — MORPHINE SULFATE 2 MG: 2 INJECTION, SOLUTION INTRAMUSCULAR; INTRAVENOUS at 06:13

## 2022-08-21 RX ADMIN — Medication 100 MG: at 11:49

## 2022-08-21 RX ADMIN — ROCURONIUM BROMIDE 50 MG: 10 INJECTION, SOLUTION INTRAVENOUS at 11:58

## 2022-08-21 RX ADMIN — CEFAZOLIN 2000 MG: 2 INJECTION, POWDER, FOR SOLUTION INTRAMUSCULAR; INTRAVENOUS at 20:43

## 2022-08-21 RX ADMIN — INSULIN LISPRO 3 UNITS: 100 INJECTION, SOLUTION INTRAVENOUS; SUBCUTANEOUS at 14:34

## 2022-08-21 RX ADMIN — INSULIN LISPRO 4 UNITS: 100 INJECTION, SOLUTION INTRAVENOUS; SUBCUTANEOUS at 20:29

## 2022-08-21 RX ADMIN — PROPOFOL 100 MG: 10 INJECTION, EMULSION INTRAVENOUS at 11:49

## 2022-08-21 RX ADMIN — INSULIN LISPRO 2 UNITS: 100 INJECTION, SOLUTION INTRAVENOUS; SUBCUTANEOUS at 14:33

## 2022-08-21 RX ADMIN — AMLODIPINE BESYLATE 5 MG: 5 TABLET ORAL at 14:24

## 2022-08-21 RX ADMIN — ENOXAPARIN SODIUM 30 MG: 100 INJECTION SUBCUTANEOUS at 17:23

## 2022-08-21 RX ADMIN — INSULIN LISPRO 3 UNITS: 100 INJECTION, SOLUTION INTRAVENOUS; SUBCUTANEOUS at 08:02

## 2022-08-21 RX ADMIN — SODIUM CHLORIDE, PRESERVATIVE FREE 10 ML: 5 INJECTION INTRAVENOUS at 20:45

## 2022-08-21 RX ADMIN — INSULIN LISPRO 6 UNITS: 100 INJECTION, SOLUTION INTRAVENOUS; SUBCUTANEOUS at 17:25

## 2022-08-21 RX ADMIN — FENTANYL CITRATE 100 MCG: 50 INJECTION, SOLUTION INTRAMUSCULAR; INTRAVENOUS at 12:31

## 2022-08-21 RX ADMIN — INSULIN LISPRO 4 UNITS: 100 INJECTION, SOLUTION INTRAVENOUS; SUBCUTANEOUS at 09:51

## 2022-08-21 RX ADMIN — DEXAMETHASONE SODIUM PHOSPHATE 4 MG: 4 INJECTION, SOLUTION INTRAMUSCULAR; INTRAVENOUS at 11:58

## 2022-08-21 RX ADMIN — SODIUM CHLORIDE: 9 INJECTION, SOLUTION INTRAVENOUS at 09:22

## 2022-08-21 RX ADMIN — METOPROLOL SUCCINATE 25 MG: 25 TABLET, EXTENDED RELEASE ORAL at 17:24

## 2022-08-21 RX ADMIN — LIDOCAINE HYDROCHLORIDE 100 MG: 20 INJECTION, SOLUTION INTRAVENOUS at 11:49

## 2022-08-21 RX ADMIN — CEFAZOLIN 2000 MG: 1 INJECTION, POWDER, FOR SOLUTION INTRAMUSCULAR; INTRAVENOUS; PARENTERAL at 12:10

## 2022-08-21 RX ADMIN — HYDROMORPHONE HYDROCHLORIDE 0.5 MG: 1 INJECTION, SOLUTION INTRAMUSCULAR; INTRAVENOUS; SUBCUTANEOUS at 14:21

## 2022-08-21 RX ADMIN — BISACODYL 5 MG: 5 TABLET, COATED ORAL at 14:24

## 2022-08-21 RX ADMIN — SODIUM CHLORIDE, POTASSIUM CHLORIDE, SODIUM LACTATE AND CALCIUM CHLORIDE: 600; 310; 30; 20 INJECTION, SOLUTION INTRAVENOUS at 14:30

## 2022-08-21 ASSESSMENT — PAIN SCALES - GENERAL
PAINLEVEL_OUTOF10: 6
PAINLEVEL_OUTOF10: 10
PAINLEVEL_OUTOF10: 4
PAINLEVEL_OUTOF10: 6
PAINLEVEL_OUTOF10: 9

## 2022-08-21 ASSESSMENT — PAIN DESCRIPTION - ORIENTATION
ORIENTATION: LEFT

## 2022-08-21 ASSESSMENT — PAIN DESCRIPTION - LOCATION
LOCATION: HIP
LOCATION: BACK
LOCATION: HIP
LOCATION: HIP

## 2022-08-21 ASSESSMENT — PAIN - FUNCTIONAL ASSESSMENT: PAIN_FUNCTIONAL_ASSESSMENT: PREVENTS OR INTERFERES WITH MANY ACTIVE NOT PASSIVE ACTIVITIES

## 2022-08-21 ASSESSMENT — PAIN DESCRIPTION - DESCRIPTORS
DESCRIPTORS: ACHING
DESCRIPTORS: ACHING
DESCRIPTORS: ACHING;THROBBING

## 2022-08-21 NOTE — PROGRESS NOTES
.Pharmacy Consult for Baricitinib Initiation per Dr. Ruta Krabbe, MD     Day 1 of 14      Dosing Recommendations:    Baricitinib 4 mg PO daily x 14 days (or until hospital discharge)    Renal dose adjustment:  -eGFR > 60: no adjustment necessary   -eGFR 30 - 60: 2 mg PO daily   -eGFR 15 - 30: 1 mg PO daily   -eGFR <15: not recommended   - HD, PD, CRRT: no recommendations at this time     Criteria per [facility] P&T Committee  **All criteria need to be met to receive   Baricitinib for COVID-19 patients**   Age    >5years old     Yes   Laboratory Results    Confirmed positive COVID-19     PLUS    ANY elevation in biomarkers   (CRP, D-dimer, LDH, ferritin)       Yes     Concomitant Therapy    Receiving systemic steroids for treatment of COVID 19     Yes   Oxygen Status        Requiring supplemental oxygen   (>1 L NC, HFNC, Heated Vapotherm, biPAP, mechanical ventilation)        Yes   Special Considerations    Pregnancy  Severe Hepatic Impairment  Chronic/Recurrent Infections   Hemoglobin <8         Clarify risk vs. benefit with provider if criteria met     Contraindications    1. Invasive active mycobacterial or fungal infection  2. Lymphocyte count <200  3. Neutrophil count, ANC <500   4. Significant immunosuppression    ?     None     C-REACTIVE PROTEIN:  CRP High Sensitivity   Date Value Ref Range Status   08/21/2022 74.9 (H) <5.0 mg/L Final       No results found for: HSCRP    FERRITIN:  No results found for: FERRITIN    D-DIMER:  Recent Labs     08/20/22 2133 08/21/22  0659   DDIMER 2223* 1515*       DILI SCREENING PARAMETERS:  ALT>=200 and ALP>=258 or ALT>=120 and BILITOTAL>=2.0     Recent Labs     08/20/22 2133 08/21/22  0659   CREATININE 0.6 0.6   LABGLOM >60 >60   GFRAA >60 >60   AST 13* 13*   ALT 18 16   ALKPHOS 82 74   BILITOT 0.5 0.5    229   SEGSABS 10.1 7.5   WBC 13.1* 10.2   LYMPHOPCT 12.9* 16.8*   HGB 10.0* 9.7*     *LABGLOM = GFR Non-  *GFRAA = GFR  American  *Lymphocytes Absolute = WBC x LYMPHOPCT divided by 100    Thank you for the consult,  -Thuy Moura, [facility]

## 2022-08-21 NOTE — PROGRESS NOTES
V2.0  [facility] Hospitalist Progress Note      Name:  Carlotta Patrick /Age/Sex: 1940  (81 y.o. female)   MRN & CSN:  [id number] & [id number] Encounter Date/Time: 2022 8:27 AM EDT    Location:  -A PCP: Luly Barnham MD       Hospital Day: 2    Assessment and Plan:   Carlotta Patrick is a 81 y.o. female with pmh of HTN, DM II who presents with Closed displaced fracture of left femoral neck ([address]      Plan:  #Acute fracture of left femoral neck 2/2 Fall at home, initial encounter  Head CT, C-spine, left elbow x-ray at [facility] ED negative for acute intracranial findings. X-ray left hip shows fracture of left surgical neck of the femur with extension into the greater trochanter and mild shortening  - Orthopedic surgery was consulted; await recs; likely going for surgery              - Pain control PRN              - Antiemetics PRN              - Case management consult   - IVF NS 12 hours    #COVID 19   Incidental finding, asymptomatic. No URI symptoms. CXR shows mild apical opacities typical for Covid-19.               -Albuterol as needed for wheezing              -no steroid indicated given pt is asymptomatic and on room air.               -Consult to Dr. Yves Araya placed per family request.     #Essential hypertension              Continue home Norvasc, lisinopril, Toprol-XL       #Non-insulin-dependent type 2 diabetes mellitus  HA1C  7.7 2022. On home oral metformin, gliclazid. Pt glucose normally controlled at home but presented with glucose 300's. Likely 2/2 stress.    - change sliding scale to MDSSI  - hypoglycemia protocol  - POCT glucose 4times daily       Diet Diet NPO   DVT Prophylaxis [x] Lovenox, []  Heparin, [] SCDs, [] Ambulation,  [] Eliquis, [] Xarelto  [] Coumadin   Code Status Full Code   Disposition From: home  Expected Disposition: TBD  Estimated Date of Discharge: TBD  Patient requires continued admission due to left hip fracture   Surrogate Decision Maker/ POA      Subjective: Chief Complaint: fall     Patient seen and examined at bedside. She only complains of weakness and fatigue. She states she has no pain in her left leg. She denies runny nose, sore throat, cough and SOB. Pt has no new complaints or concerns. Discussed pt's care with daughter and son in law who is her PCP. Denies lightheadedness, dizziness, fever, night sweats, chills, chest pain, palpitations, abd pain, nausea, vomiting, diarrhea, dysuria. Review of Systems:    Review of Systems    See above      Objective:   No intake or output data in the 24 hours ending 08/21/22 0917     Vitals:   Vitals:    08/21/22 0759   BP: (!) 151/64   Pulse: (!) 119   Resp: 19   Temp: 98.4 °F (36.9 °C)   SpO2: 98%       Physical Exam:     General: NAD  Eyes: EOMI  ENT: neck supple  Cardiovascular: Regular rate. Respiratory: Clear to auscultation  Gastrointestinal: Soft, non tender  Genitourinary: no suprapubic tenderness  Musculoskeletal:  E. Deformity and tenderness along with left hip shortening and internal rotation. Brace in position. Skin: warm, dry  Neuro: Alert. Psych: Mood appropriate.      Medications:   Medications:    insulin lispro  0-8 Units SubCUTAneous TID WC    insulin lispro  0-4 Units SubCUTAneous Nightly    insulin lispro  3 Units SubCUTAneous Once    amLODIPine  5 mg Oral Daily    lisinopril  5 mg Oral Daily    metoprolol succinate  25 mg Oral QPM    sodium chloride flush  5-40 mL IntraVENous 2 times per day    bisacodyl  5 mg Oral Daily    enoxaparin  30 mg SubCUTAneous Daily      Infusions:    sodium chloride      dextrose      sodium chloride       PRN Meds: glucose, 4 tablet, PRN  dextrose bolus, 125 mL, PRN   Or  dextrose bolus, 250 mL, PRN  glucagon (rDNA), 1 mg, PRN  dextrose, , Continuous PRN  sodium chloride flush, 5-40 mL, PRN  sodium chloride, , PRN  polyethylene glycol, 17 g, Daily PRN  acetaminophen, 650 mg, Q6H PRN   Or  acetaminophen, 650 mg, Q6H PRN  ibuprofen, 400 mg, Q12H DIFFERENTIAL     Segs Relative 77.6 (H) 36 - 66 %    Lymphocytes % 12.9 (L) 24 - 44 %    Monocytes % 8.9 (H) 0 - 4 %    Eosinophils % 0.0 0 - 3 %    Basophils % 0.3 0 - 1 %    Segs Absolute 10.1 K/CU MM    Lymphocytes Absolute 1.7 K/CU MM    Monocytes Absolute 1.2 K/CU MM    Eosinophils Absolute 0.0 K/CU MM    Basophils Absolute 0.0 K/CU MM    Nucleated RBC % 0.0 %    Total Nucleated RBC 0.0 K/CU MM    Total Immature Neutrophil 0.04 K/CU MM    Immature Neutrophil % 0.3 0 - 0.43 %   Protime/INR & PTT    Collection Time: 08/20/22  9:33 PM   Result Value Ref Range    Protime 12.8 11.7 - 14.5 SECONDS    INR 0.99 INDEX    aPTT 37.6 (H) 25.1 - 37.1 SECONDS   Vitamin D 25 Hydroxy    Collection Time: 08/21/22  6:59 AM   Result Value Ref Range    Vit D, 25-Hydroxy 27.37 >20 NG/ML   Comprehensive Metabolic Panel w/ Reflex to MG    Collection Time: 08/21/22  6:59 AM   Result Value Ref Range    Sodium 132 (L) 135 - 145 MMOL/L    Potassium 4.1 3.5 - 5.1 MMOL/L    Chloride 97 (L) 99 - 110 mMol/L    CO2 26 21 - 32 MMOL/L    BUN 17 6 - 23 MG/DL    Creatinine 0.6 0.6 - 1.1 MG/DL    Glucose 305 (H) 70 - 99 MG/DL    Calcium 9.9 8.3 - 10.6 MG/DL    Albumin 3.9 3.4 - 5.0 GM/DL    Total Protein 6.3 (L) 6.4 - 8.2 GM/DL    Total Bilirubin 0.5 0.0 - 1.0 MG/DL    ALT 16 10 - 40 U/L    AST 13 (L) 15 - 37 IU/L    Alkaline Phosphatase 74 40 - 128 IU/L    GFR Non-African American >60 >60 mL/min/1.73m2    GFR African American >60 >60 mL/min/1.73m2    Anion Gap 9 4 - 16   CBC with Auto Differential    Collection Time: 08/21/22  6:59 AM   Result Value Ref Range    WBC 10.2 4.0 - 10.5 K/CU MM    RBC 3.52 (L) 4.2 - 5.4 M/CU MM    Hemoglobin 9.7 (L) 12.5 - 16.0 GM/DL    Hematocrit 30.3 (L) 37 - 47 %    MCV 86.1 78 - 100 FL    MCH 27.6 27 - 31 PG    MCHC 32.0 32.0 - 36.0 %    RDW 14.4 11.7 - 14.9 %    Platelets 626 374 - 778 K/CU MM    MPV 9.9 7.5 - 11.1 FL    Differential Type AUTOMATED DIFFERENTIAL     Segs Relative 73.5 (H) 36 - 66 %    Lymphocytes % 16.8 (L) 24 - 44 %    Monocytes % 8.9 (H) 0 - 4 %    Eosinophils % 0.2 0 - 3 %    Basophils % 0.3 0 - 1 %    Segs Absolute 7.5 K/CU MM    Lymphocytes Absolute 1.7 K/CU MM    Monocytes Absolute 0.9 K/CU MM    Eosinophils Absolute 0.0 K/CU MM    Basophils Absolute 0.0 K/CU MM    Nucleated RBC % 0.0 %    Total Nucleated RBC 0.0 K/CU MM    Total Immature Neutrophil 0.03 K/CU MM    Immature Neutrophil % 0.3 0 - 0.43 %   Protime-INR    Collection Time: 08/21/22  6:59 AM   Result Value Ref Range    Protime 12.4 11.7 - 14.5 SECONDS    INR 0.96 INDEX   D-Dimer, Quantitative    Collection Time: 08/21/22  6:59 AM   Result Value Ref Range    D-Dimer, Quant 1515 (H) <230 NG/mL(DDU)   High sensitivity CRP    Collection Time: 08/21/22  6:59 AM   Result Value Ref Range    CRP High Sensitivity 74.9 (H) <5.0 mg/L   POCT Glucose    Collection Time: 08/21/22  7:57 AM   Result Value Ref Range    POC Glucose 304 (H) 70 - 99 MG/DL        Imaging/Diagnostics Last 24 Hours   XR CHEST PORTABLE    Result Date: 8/20/2022  EXAMINATION: ONE XRAY VIEW OF THE CHEST 8/20/2022 6:27 pm COMPARISON: None. HISTORY: ORDERING SYSTEM PROVIDED HISTORY: COVID 19 +, TECHNOLOGIST PROVIDED HISTORY: Reason for exam:->COVID 23 +, Reason for Exam: covid 23 + FINDINGS: Overlying items external to the patient somewhat limit evaluation. Mild hazy airspace opacities bilaterally to upper lung zones. No pleural effusions or pneumothoraces. Cardiac and mediastinal silhouettes are within normal limits. No acute bony abnormalities. Mild hazy airspace opacities to bilateral upper lung zones, nonspecific but would be compatible with COVID-19 pneumonia given the clinical history.        Electronically signed by Karin Coy MD on 8/21/2022 at 9:17 AM

## 2022-08-21 NOTE — ANESTHESIA POSTPROCEDURE EVALUATION
Department of Anesthesiology  Postprocedure Note    Patient: Rosenad Rios  MRN: [id number]  YOB: 1940  Date of evaluation: 8/21/2022      Procedure Summary     Date: 08/21/22 Room / Location: [address]    Anesthesia Start: 0355 Anesthesia Stop: 0719    Procedure: FIXATION LEFT HIP (Left: Hip) Diagnosis:       Closed fracture of hip, unspecified laterality, initial encounter [facility]      (Left femur fracture)    Surgeons: Edison Adkins MD Responsible Provider: Radha Trent MD    Anesthesia Type: general ASA Status: 4 - Emergent          Anesthesia Type: No value filed.     Ngoc Phase I: Ngoc Score: 10    Ngoc Phase II:        Anesthesia Post Evaluation    Patient location during evaluation: bedside  Patient participation: complete - patient participated  Level of consciousness: awake  Pain score: 0  Airway patency: patent  Nausea & Vomiting: no vomiting and no nausea  Complications: no  Cardiovascular status: hemodynamically stable  Respiratory status: acceptable, airway suctioned, spontaneous ventilation and room air  Hydration status: stable

## 2022-08-21 NOTE — CONSULTS
CONSULT    Patient Name: Augie Landers   ([birth year])  MRN   [id number]   Today's date:  8/21/2022    CHIEF COMPLAINT:   Left hip pain    History Obtained From:  patient, family member - daughter, electronic medical record    HISTORY OF PRESENT ILLNESS:      The patient is a 81 y.o. female  who presents to the ER after a fall. She states she got up and fell landing onto her left side    She was seen in [facility] emergency room and transferred here for definitive management of her fracture    X-rays show a basicervical/intertrochanteric fracture. After the fall the patient was unable to ambulate and the pain was sudden, sharp and rated 7/10    The patient denied any chest pain, shortness of breath or syncopal episode prior to the fall. Patient incidentally was found to be tested positive for COVID. She does not admit to any fevers chills nausea vomiting or shortness of breath. Past Medical History   No past medical history on file. Past Surgical History   No past surgical history on file. Medications Prior to Admission:     Prior to Admission medications    Medication Sig Start Date End Date Taking? Authorizing Provider   amLODIPine (NORVASC) 5 MG tablet Take 5 mg by mouth daily   Yes Historical Provider, MD   metoprolol succinate (TOPROL XL) 25 MG extended release tablet Take 25 mg by mouth daily   Yes Historical Provider, MD   SITagliptin (JANUVIA) 50 MG tablet Take 50 mg by mouth daily   Yes Historical Provider, MD   glipiZIDE (GLUCOTROL) 10 MG tablet Take 60 mg by mouth   Yes Historical Provider, MD   lisinopril (PRINIVIL;ZESTRIL) 2.5 MG tablet Take 5 mg by mouth daily    Historical Provider, MD       Allergies     Aspirin    Social History   TOBACCO:   reports that she has never smoked. She has never used smokeless tobacco.  ETOH:   has no history on file for alcohol use.   Patient currently lives [place] and is visiting family for 6 months    Family History   No family history on file. Review of Systems   Constitutional:  No weight loss, no night sweats   Eyes:  No visual changes  ENT:  No nasal drainage or ear pain  CV:  No chest pain  PULM:  No cough, no shortness of breath  GI:  No nausea, vomiting, diarrhea  :  No dysuria  Musc:  Hip pain-See HPI  Neuro: No numbness, tingling or parethesias  Skin:  No rashes  Psych: No depression symptoms    Physical Exam:    Vitals: BP (!) 151/64   Pulse (!) 119   Temp 98.4 °F (36.9 °C) (Oral)   Resp 19   Ht 5' 2\" (1.575 m)   Wt 156 lb 4.9 oz (70.9 kg)   SpO2 98%   BMI 28.59 kg/m² ,  Body mass index is 28.59 kg/m². General appearance: alert, appears stated age and cooperative  Skin: Skin color, texture, turgor normal. No rashes or lesions  HEENT: Head: Normocephalic, no lesions, without obvious abnormality. Neck: no adenopathy, no carotid bruit, no JVD, supple, symmetrical, trachea midline, and thyroid not enlarged, symmetric, no tenderness/mass/nodules  Extremities:    - Left hip tender to palpation.     -Pain with PROM to hip, full assessment not done due to known fracture   -No knee or ankle deformity or significant tenderness   -Good distal pulses with good capillary refill   -Able to dorsiflex and plantar flex ankle and toes-bilaterally   -No tenderness over bilateral wrist, elbow or shoulders.   Neurologic: Mental status: Alert, oriented, thought content appropriate    Labs     CBC:   Recent Labs     08/20/22 2133 08/21/22  0659   WBC 13.1* 10.2   HGB 10.0* 9.7*    229     BMP:    Recent Labs     08/20/22 2133 08/21/22  0659   * 132*   K 4.4 4.1   CL 94* 97*   CO2 25 26   BUN 19 17   CREATININE 0.6 0.6   GLUCOSE 350* 305*     INR:    Lab Results   Component Value Date    INR 0.96 08/21/2022    INR 0.99 08/20/2022    PROTIME 12.4 08/21/2022    PROTIME 12.8 08/20/2022        X-ray view   Imaging Review-X-rays were personally reviewed by myself        Assessment and Plan     Patient Active Problem List   Diagnosis Code COVID-19 U07.1    Closed displaced fracture of left femoral neck ([facility]) S72.002A    Femoral neck stress fracture, initial encounter M84.353A       1. Left Intertrochanteric/Basicervical Hip Fracture      Based on the fracture pattern I recommend operative fixation of the fracture with a trochanteric nail. I have mentioned to the patient and family that we will obtain traction views. If traction view shows anatomic alignment and no involvement of the femoral neck then we will proceed with a trochanteric nail. If there is evidence of more neck involvement and intertrochanteric then we will consider hemiarthroplasty. The goal of surgical intervention is pain control and mobility. Postoperatively the patient will remain in the hospital for pain control, physical therapy, as well as DVT prophylaxis. Surgical risks were explained to the patient as well as the family (Daughter); these included but not limited to infection, nonunion, nail cut out, continued pain after surgical intervention. Risks also include post-operative deep venous thrombosis, heart attack, stroke and death. There is also at risk for loss of ambulatory status or a decreased level of ambulatory status. Postoperatively the patient will be weightbearing as tolerated and physical therapy will work with the patient on a daily basis. We will order the use incentive spirometry to prevent pneumonia postoperatively. The patient will receive 24 hours of antibiotics postoperatively and the [place] will be planned to be discontinued on postoperative day #2. Patient will be given both chemo (Lovenox) and mechanical (GRISEL cordell and SCD's) DVT prophylaxis post-operatively. I have mentioned she is increased risk for developing a DVT with blood clots and possible PE due to her positive COVID. Post-operatively, we will assess the patients discharge needs with the help of case management.   The patient will likely require a stay at a skilled nursing unit or ARU. The patient and family understands the risks and benefits of surgery and wishes to proceed with operative intervention.        Monica Orr MD

## 2022-08-21 NOTE — OP NOTE
OPERATIVE NOTE    Patient Name:   Jayce Rosales   ([birth year])  MRN       [id number]   Surgery date:   8/21/2022    Preoperative Diagnosis:   Left Intertrochanteric/Basicervical Hip Fracture    Postoperative Diagnosis:   Same    Procedure:     Left  Trochanteric Nail               Surgeon:    Nadja Jacob MD    Assistant:     None    Anesthesia:    General endotrachial anesthesia    EBL:    150ml     Implants:   Synthes Trochanteric Nail       130 degree 11mm x 360mm nail with 19GH helical blade    Surgical Indications  The patient presented to the emergency room and was diagnosed with an Intertrochanteric/Basicervical hip fracture. The patient was admitted to the hospital and received medical clearance. We discussed treatment options and I recommended operative fixation with trochanteric intramedullary nailing. Risks, benefits, expected outcomes and potential complications were discussed as outlined in the H&P. At no time were any guarantees implied or stated. I have mentioned that it is a basicervical and there is a possibility of nonunion or AVN and may require further treatment    Patient Positioning and Surgical Prep  The patient was seen in the holding area and the left extremity marked with an indelible pen. The patient was taken to the operative suite, identified and while on the hospital bed, anesthesia was administered. The patient was transferred to the fracture table. The affected leg was placed in boot traction after the foot was well padded. The unaffected leg was gently abducted and externally rotated. The fracture was well visualized using biplanar fluoroscopy and the fracture reduced or manipulated as required. The lower extremity was prepped from the flank to knee with Duraprep and then draped in the normal sterile fashion. A time-out was then performed confirming the patient's name, operative side, proposed procedure and administration of antibiotics.       Exposure  Closed reduction was performed using the fracture table with longitudinal traction. An incision was made proximal to the greater trochanter. The fascia and muscle were split in line with their fibers. Using biplanar, c-arm fluoroscopy. A starting hole was identified at the tip of the greater trochanter. A guide wire was inserted and a 17mm cannulated reamer introduced to open the proximal femur. The long reaming guide wire was inserted into the medullary canal and the proximal femur reamed as necessary. We then measured the proposed length of the femur for the ultimate nail insertion. When then reamed up to 12.5mm to allow an adequate fit. Insertion of Long Trochanteric Femoral Nail  The long trochanteric femoral nail was attached to the insertion handle and inserted over the reaming guide wire. Using biplanar fluoroscopy the knee was visualized confirming length of the nail at the knee. The guide wire was removed. An incision was made along the lateral aspect of the thigh and through the fascia. The blade guide sleeve was inserted and snapped into the aiming guide. The sleeve assembly was advanced to the lateral femur. A 3.2 mm guide wire was drilled into the femoral head and position as close to the center of the femoral head was obtained confirmed with biplanar fluoroscopy. The length for the helical blade was measured to be 90mm. The 11.0 cannulated drill was used to ream out the lateral cortex and the reamer was then placed into the femoral head. The helical blade was assembled to the  and impacted into place. Using the flexible screwdriver, the preassembled locking mechanism was engaged by advancing the screw. The  and aiming guide were disengaged. Final fluoroscopic views were obtained. We then placed a distal locking screw in the standard manner and was placement and legthn was confirmed with fluoroscopy, size 40mm.     Closure and Disposition  The wounds were copiously irrigated and closed in layers. Sterile dressings were applied. All sponge and needle counts were correct. The patient was awakened and taken to the postoperative area in stable condition. I spoke with the patients family in the consultation room postoperatively. ADDENDUM:  Biplanar fluoroscopy was used throughout the case to confirm satisfactory fracture reduction and placement of all hardware.

## 2022-08-21 NOTE — PLAN OF CARE
Problem: Discharge Planning  Goal: Discharge to home or other facility with appropriate resources  8/21/2022 0817 by Mariluz Red RN  Outcome: Progressing  Flowsheets (Taken 8/21/2022 3285)  Discharge to home or other facility with appropriate resources:   Identify barriers to discharge with patient and caregiver   Arrange for needed discharge resources and transportation as appropriate   Identify discharge learning needs (meds, wound care, etc)   Arrange for interpreters to assist at discharge as needed   Refer to discharge planning if patient needs post-hospital services based on physician order or complex needs related to functional status, cognitive ability or social support system  8/20/2022 2317 by Nichol Baker RN  Outcome: Progressing  Flowsheets  Taken 8/20/2022 2308  Discharge to home or other facility with appropriate resources: Identify barriers to discharge with patient and caregiver  Taken 8/20/2022 2030  Discharge to home or other facility with appropriate resources: Identify barriers to discharge with patient and caregiver     Problem: Skin/Tissue Integrity  Goal: Absence of new skin breakdown  Description: 1. Monitor for areas of redness and/or skin breakdown  2. Assess vascular access sites hourly  3. Every 4-6 hours minimum:  Change oxygen saturation probe site  4. Every 4-6 hours:  If on nasal continuous positive airway pressure, respiratory therapy assess nares and determine need for appliance change or resting period.   8/21/2022 0817 by Mariluz Red RN  Outcome: Progressing  8/20/2022 2317 by Nichol Baker RN  Outcome: Progressing     Problem: Safety - Adult  Goal: Free from fall injury  8/21/2022 0817 by Mariluz Red RN  Outcome: Progressing  8/20/2022 2317 by Nichol Baker RN  Outcome: Progressing     Problem: ABCDS Injury Assessment  Goal: Absence of physical injury  8/21/2022 0817 by Mariluz Red RN  Outcome: Progressing  8/20/2022 2317 by Baron Joseph RN  Outcome: Progressing     Problem: Pain  Goal: Verbalizes/displays adequate comfort level or baseline comfort level  8/21/2022 0817 by Jenny Ruiz RN  Outcome: Progressing  Flowsheets (Taken 8/21/2022 3934)  Verbalizes/displays adequate comfort level or baseline comfort level:   Encourage patient to monitor pain and request assistance   Assess pain using appropriate pain scale   Administer analgesics based on type and severity of pain and evaluate response   Implement non-pharmacological measures as appropriate and evaluate response   Consider cultural and social influences on pain and pain management   Notify Licensed Independent Practitioner if interventions unsuccessful or patient reports new pain  8/20/2022 2317 by Baron Joseph RN  Outcome: Progressing

## 2022-08-21 NOTE — ANESTHESIA PRE PROCEDURE
Department of Anesthesiology  Preprocedure Note       Name:  Ana Laura Desouza   Age:  81 y.o.  :  1940                                          MRN:  [id number]         Date:  2022      Surgeon: Jose De Jesus Jordan):  Monica Orr MD    Procedure: Procedure(s):  IM NAIL    Medications prior to admission:   Prior to Admission medications    Medication Sig Start Date End Date Taking?  Authorizing Provider   amLODIPine (NORVASC) 5 MG tablet Take 5 mg by mouth daily   Yes Historical Provider, MD   metoprolol succinate (TOPROL XL) 25 MG extended release tablet Take 25 mg by mouth daily   Yes Historical Provider, MD   SITagliptin (JANUVIA) 50 MG tablet Take 50 mg by mouth daily   Yes Historical Provider, MD   glipiZIDE (GLUCOTROL) 10 MG tablet Take 60 mg by mouth   Yes Historical Provider, MD   lisinopril (PRINIVIL;ZESTRIL) 2.5 MG tablet Take 5 mg by mouth daily    Historical Provider, MD       Current medications:    Current Facility-Administered Medications   Medication Dose Route Frequency Provider Last Rate Last Admin    insulin lispro (HUMALOG) injection vial 0-8 Units  0-8 Units SubCUTAneous TID WC Delmi Barriga MD   4 Units at 22 0951    insulin lispro (HUMALOG) injection vial 0-4 Units  0-4 Units SubCUTAneous Nightly Delmi Barriga MD        0.9 % sodium chloride infusion   IntraVENous Continuous Delmi Barriga  mL/hr at 22 0922 New Bag at 22 3009    insulin lispro (HUMALOG) injection vial 3 Units  3 Units SubCUTAneous Once Delmi Barriga MD        methylPREDNISolone (MEDROL) tablet 6 mg  6 mg Oral Daily Nahun Kapoor MD        baricitinib (OLUMIANT) tablet 4 mg  4 mg Oral Daily Nahun Kapoor MD        amLODIPine (NORVASC) tablet 5 mg  5 mg Oral Daily JACQUELINE Menendez - PAUL        lisinopril (PRINIVIL;ZESTRIL) tablet 5 mg  5 mg Oral Daily JACQUELINE Menendez - CNP        metoprolol succinate (TOPROL XL) extended release tablet 25 mg  25 mg Oral QPM Cayla Galdamez, APRN - CNP        glucose chewable tablet 16 g  4 tablet Oral PRN Maryjane I Zay, APRN - CNP        dextrose bolus 10% 125 mL  125 mL IntraVENous PRN Maryjane I TOR CollazoN - CNP        Or    dextrose bolus 10% 250 mL  250 mL IntraVENous PRN Maryjane I Zay, APRN - CNP        glucagon (rDNA) injection 1 mg  1 mg SubCUTAneous PRN Maryjane I Zay, APRN - CNP        dextrose 10 % infusion   IntraVENous Continuous PRN Maryjane I Zay, APRN - CNP        sodium chloride flush 0.9 % injection 5-40 mL  5-40 mL IntraVENous 2 times per day Cayla Galdamez, APRN - CNP   10 mL at 08/20/22 2200    sodium chloride flush 0.9 % injection 5-40 mL  5-40 mL IntraVENous PRN Maryjane I Zay, APRN - CNP        0.9 % sodium chloride infusion   IntraVENous PRN Maryjane I TOR CollazoN - CNP        polyethylene glycol (GLYCOLAX) packet 17 g  17 g Oral Daily PRN Maryjane I TOR CollazoN - CNP        acetaminophen (TYLENOL) tablet 650 mg  650 mg Oral Q6H PRN Maryjane I TOR CollazoN - CNP        Or    acetaminophen (TYLENOL) suppository 650 mg  650 mg Rectal Q6H PRN Maryjane I TOR CollazoN - CNP        ibuprofen (ADVIL;MOTRIN) tablet 400 mg  400 mg Oral Q12H PRN Maryjane I JACQUELINE Collazo - CNP        morphine (PF) injection 2 mg  2 mg IntraVENous Q2H PRN Cayla Galdamez, APRN - CNP   2 mg at 08/21/22 3561    Or    morphine sulfate (PF) injection 4 mg  4 mg IntraVENous Q2H PRN Maryjane I JACQUELINE Collazo - CNP        bisacodyl (DULCOLAX) EC tablet 5 mg  5 mg Oral Daily Maryjane I Zay, JACQUELINE - PAUL        promethazine (PHENERGAN) tablet 12.5 mg  12.5 mg Oral Q6H PRN Maryjane I JACQUELINE Collazo - CNP        Or    ondansetron (ZOFRAN) injection 4 mg  4 mg IntraVENous Q6H PRN Maryjane I ZayJACQUELINE shepard - CNP        guaiFENesin-dextromethorphan (ROBITUSSIN DM) 100-10 MG/5ML syrup 5 mL  5 mL Oral Q4H PRN JACQUELINE Cox - CNP        enoxaparin Sodium (LOVENOX) injection 30 mg  30 mg SubCUTAneous Daily Maryjane Collazo, APRN - CNP           Allergies: Allergies   Allergen Reactions    Aspirin Anaphylaxis       Problem List:    Patient Active Problem List   Diagnosis Code    COVID-19 U07.1    Closed displaced fracture of left femoral neck ([facility] [address] S72.002A    Femoral neck stress fracture, initial encounter M84.353A       Past Medical History:  No past medical history on file. Past Surgical History:  No past surgical history on file. Social History:    Social History     Tobacco Use    Smoking status: Never    Smokeless tobacco: Never   Substance Use Topics    Alcohol use: Not on file                                Counseling given: No      Vital Signs (Current):   Vitals:    08/21/22 0005 08/21/22 0643 08/21/22 0759 08/21/22 1016   BP:   (!) 151/64    Pulse:   (!) 119    Resp:  18 19    Temp:   98.4 °F (36.9 °C)    TempSrc:   Oral    SpO2: 98%  98% 98%   Weight:   156 lb 4.9 oz (70.9 kg)    Height:   5' 2\" (1.575 m)                                               BP Readings from Last 3 Encounters:   08/21/22 (!) 151/64       NPO Status:                                                                                 BMI:   Wt Readings from Last 3 Encounters:   08/21/22 156 lb 4.9 oz (70.9 kg)     Body mass index is 28.59 kg/m².     CBC:   Lab Results   Component Value Date/Time    WBC 10.2 08/21/2022 06:59 AM    RBC 3.52 08/21/2022 06:59 AM    HGB 9.7 08/21/2022 06:59 AM    HCT 30.3 08/21/2022 06:59 AM    MCV 86.1 08/21/2022 06:59 AM    RDW 14.4 08/21/2022 06:59 AM     08/21/2022 06:59 AM       CMP:   Lab Results   Component Value Date/Time     08/21/2022 06:59 AM    K 4.1 08/21/2022 06:59 AM    CL 97 08/21/2022 06:59 AM    CO2 26 08/21/2022 06:59 AM    BUN 17 08/21/2022 06:59 AM    CREATININE 0.6 08/21/2022 06:59 AM    GFRAA >60 08/21/2022 06:59 AM    LABGLOM >60 08/21/2022 06:59 AM    GLUCOSE 305 08/21/2022 06:59 AM    PROT 6.3 08/21/2022 06:59 AM    CALCIUM 9.9 08/21/2022 06:59 AM    BILITOT 0.5 08/21/2022 06:59 AM    ALKPHOS 74 08/21/2022 06:59 AM    AST 13 08/21/2022 06:59 AM    ALT 16 08/21/2022 06:59 AM       POC Tests:   Recent Labs     08/21/22  0949   POCGLU 260*       Coags:   Lab Results   Component Value Date/Time    PROTIME 12.4 08/21/2022 06:59 AM    INR 0.96 08/21/2022 06:59 AM    APTT 37.6 08/20/2022 09:33 PM       HCG (If Applicable): No results found for: PREGTESTUR, PREGSERUM, HCG, HCGQUANT     ABGs: No results found for: PHART, PO2ART, ANN1KKP, RTQ0PZP, BEART, K5OLQNYW     Type & Screen (If Applicable):  No results found for: LABABO, LABRH    Drug/Infectious Status (If Applicable):  No results found for: HIV, HEPCAB    COVID-19 Screening (If Applicable): No results found for: COVID19        Anesthesia Evaluation    Airway: Mallampati: II  TM distance: >3 FB   Neck ROM: full  Mouth opening: > = 3 FB   Dental:    (+) poor dentition      Pulmonary:normal exam    (+) pneumonia: no interval change,                             Cardiovascular:                      Neuro/Psych:               GI/Hepatic/Renal:             Endo/Other:                     Abdominal:             Vascular: Other Findings:           Anesthesia Plan      general     ASA 4 - emergent     (Essential tremors on room air)  Induction: intravenous. MIPS: Postoperative opioids intended. Anesthetic plan and risks discussed with patient and child/children. Plan discussed with CRNA.     Attending anesthesiologist reviewed and agrees with Shakir Ding MD   8/21/2022

## 2022-08-21 NOTE — PROGRESS NOTES
1333 Patient recovered in [place] room. Monitors applied and alarms on. No drainage from sites. Report from [place]. 1340 Patient repositioned in bed.   1343 Report called to [facility] on floor. 1348 Patient transferred out of OR to floor.

## 2022-08-21 NOTE — CONSULTS
If you have any questions regarding your visit, Please contact your care team.     [employer]: [phone]    [facility] CLINIC HOURS TELEPHONE NUMBER       Kelvin Waddell M.D.      Marley-      Duncan Wills-Medical Assistant       Monday-[place]  8:00a.m-4:45 p.m  Tuesday-[place]  9:00a.m-11:45 a.m  Wednesday-[place] 8:00a.m-4:45 p.m.  Thursday-[place]  8:00a.m-4:45 p.m.  Friday-[place]  8:00a.m-4:45 p.m. [facility]  [address] [place] [address]  [phone] ask for [facility]  [phone] Fax  Imaging [phone]    [facility]  [address] [place] [address] [place] [address]  [phone]    [facility]  [address] [place] [address]  [phone] ask [facility]  [phone] Fax  Imaging [phone]     Urgent Care locations:    [facility] Monday-Friday  5 pm - 9 pm  Saturday and Sunday   9 am - 5 pm    Monday-Friday   11 am - 9 pm  Saturday and Sunday   9 am - 5 pm   [phone]       If you need a medication refill, please contact your pharmacy. Please allow 3 business days for your refill to be completed.  As always, Thank you for trusting us with your healthcare needs!     [facility]               [address], [address] [facility]                                  CONSULTATION    PATIENT NAME: Miles Bowen                        :        1940  MED REC NO:   [id number]                          ROOM:       2016  ACCOUNT NO:   [de-identified]                           ADMIT DATE: 2022  PROVIDER:     Chaparro Bella MD    CONSULT DATE:  2022    HISTORY OF PRESENT ILLNESS:  The patient is an 71-year-old lady with  history of hypertension, diabetes, essential tremors, who went to the  emergency room after she fell from the bed at home and fractured her  left hip. She was complaining of left hip pain in the emergency room,  she had an x-ray, which showed left fracture of the neck of the femur. She was initially seen at [facility] Emergency Room from where she was  admitted to the [facility]. Incidentally, she  was found to be COVID positive. She denies any shortness of breath. She denies any cough. She denies any fever or chills. She denies any  nausea or vomiting. She denies any chest pains. She had a chest x-ray,  which showed mild hazy opacities in the upper lobes. Her saturation has  been running in the high 90s on room air. PAST MEDICAL HISTORY:  Significant for hypertension, diabetes, essential  tremors. PAST SURGICAL HISTORY:  Not available. FAMILY HISTORY:  Not available. SOCIAL HISTORY:  Reveals that she is a nonsmoker. No history of alcohol  or drug abuse. MEDICATIONS:  Her medications were reviewed. ALLERGIES:  She is allergic to ASPIRIN. REVIEW OF SYSTEMS:  A 10 to 14-point review of systems was reviewed and  is negative except for what is mentioned in the history of present  illness. PHYSICAL EXAMINATION:  GENERAL:  On physical exam, the patient is alert, oriented x3, in no  acute respiratory distress.   VITAL SIGNS:  Her blood pressure is 151/64 mmHg, pulse of 102 per  minute, and respiratory rate of 19 per minute. She is afebrile. Her  saturation is 98% on room air. HEENT:  Exam is essentially unremarkable. There is no JVD. No  lymphadenopathy. NECK:  Supple. LUNGS:  Clear to auscultation. HEART:  Exam showed normal S1, S2. There was no S3 or S4 noted. ABDOMEN:  Exam is benign. There is no evidence of any organomegaly. The bowel sounds are present. NEUROLOGIC:  Exam reveals that she is awake and responsive, answering  questions appropriately. LABORATORY DATA:  Her CBC showed a white count of 10.2, hemoglobin 9.7,  hematocrit 30.3. Her electrolytes showed a sodium of 132, potassium  4.1, chloride 97, carbon dioxide 26, BUN 17, creatinine 0.6. IMAGING:  Her x-ray of the hip showed fracture of the lateral left  femoral neck. Her vitamin D level was 27.37. Her high sensitivity CRP  was 74.9. Her D-dimer was 2223 yesterday, and is down to 1515 today. IMPRESSIONS:  1. Fracture of the left femoral neck status post fall. 2.  Incidentally found to have COVID pneumonia without symptoms. 3.  History of hypertension. 4.  History of diabetes. PLAN:  1. The patient will be going for surgery today. 2.  We will start the patient on Medrol 6 mg daily. 3.  Consult pharmacy for baricitinib. 4.  Lovenox subcu has been ordered. 5.  Discussed with Orthopedic Surgery. 6.  Discussed with the family. 7.  As per orders.         Terrance Gutierrez MD    D: 08/21/2022 10:53:35       T: 08/21/2022 10:56:01     /[email or web address]  Job#: [id number]     Doc#: [id number]    CC:

## 2022-08-21 NOTE — BRIEF OP NOTE
OPERATIVE NOTE    Patient name  Teresa Walton  MRN    [id number]    Date of Procedure  8/21/2022      Preoperative Diagnosis: Left Basicervical/Intertrochanteric Hip Fracture    Postoperative Diagnosis: Same    Procedure Performed: Trochanteric Intramedullary Nail Left Hip                                   Surgeon:   Darleene Ganser. Bruce Brown MD    Anesthesia:    General endotrachial anesthesia    Estimated blood loss:  200 ml    Specimens:   None    Complications:    None      Darleene Ganser.  Bruce Brown MD

## 2022-08-22 LAB
ALBUMIN SERPL-MCNC: 3.8 GM/DL (ref 3.4–5)
ALP BLD-CCNC: 66 IU/L (ref 40–128)
ALT SERPL-CCNC: 15 U/L (ref 10–40)
ANION GAP SERPL CALCULATED.3IONS-SCNC: 8 MMOL/L (ref 4–16)
AST SERPL-CCNC: 17 IU/L (ref 15–37)
BASOPHILS ABSOLUTE: 0 K/CU MM
BASOPHILS RELATIVE PERCENT: 0.1 % (ref 0–1)
BILIRUB SERPL-MCNC: 0.3 MG/DL (ref 0–1)
BUN BLDV-MCNC: 18 MG/DL (ref 6–23)
CALCIUM SERPL-MCNC: 9.9 MG/DL (ref 8.3–10.6)
CHLORIDE BLD-SCNC: 101 MMOL/L (ref 99–110)
CO2: 25 MMOL/L (ref 21–32)
CREAT SERPL-MCNC: 0.7 MG/DL (ref 0.6–1.1)
DIFFERENTIAL TYPE: ABNORMAL
EOSINOPHILS ABSOLUTE: 0 K/CU MM
EOSINOPHILS RELATIVE PERCENT: 0 % (ref 0–3)
GFR AFRICAN AMERICAN: >60 ML/MIN/1.73M2
GFR NON-AFRICAN AMERICAN: >60 ML/MIN/1.73M2
GLUCOSE BLD-MCNC: 179 MG/DL (ref 70–99)
GLUCOSE BLD-MCNC: 254 MG/DL (ref 70–99)
GLUCOSE BLD-MCNC: 272 MG/DL (ref 70–99)
GLUCOSE BLD-MCNC: 318 MG/DL (ref 70–99)
GLUCOSE BLD-MCNC: 352 MG/DL (ref 70–99)
GLUCOSE BLD-MCNC: 354 MG/DL (ref 70–99)
GLUCOSE BLD-MCNC: 396 MG/DL (ref 70–99)
HCT VFR BLD CALC: 25.3 % (ref 37–47)
HEMOGLOBIN: 8.1 GM/DL (ref 12.5–16)
IMMATURE NEUTROPHIL %: 0.4 % (ref 0–0.43)
LYMPHOCYTES ABSOLUTE: 2.1 K/CU MM
LYMPHOCYTES RELATIVE PERCENT: 20.1 % (ref 24–44)
MAGNESIUM: 2 MG/DL (ref 1.8–2.4)
MCH RBC QN AUTO: 28 PG (ref 27–31)
MCHC RBC AUTO-ENTMCNC: 32 % (ref 32–36)
MCV RBC AUTO: 87.5 FL (ref 78–100)
MONOCYTES ABSOLUTE: 0.9 K/CU MM
MONOCYTES RELATIVE PERCENT: 9 % (ref 0–4)
NUCLEATED RBC %: 0 %
PDW BLD-RTO: 14.6 % (ref 11.7–14.9)
PHOSPHORUS: 2.4 MG/DL (ref 2.5–4.9)
PLATELET # BLD: 201 K/CU MM (ref 140–440)
PMV BLD AUTO: 9.3 FL (ref 7.5–11.1)
POTASSIUM SERPL-SCNC: 4.4 MMOL/L (ref 3.5–5.1)
PRO-BNP: 238.4 PG/ML
RBC # BLD: 2.89 M/CU MM (ref 4.2–5.4)
SEGMENTED NEUTROPHILS ABSOLUTE COUNT: 7.2 K/CU MM
SEGMENTED NEUTROPHILS RELATIVE PERCENT: 70.4 % (ref 36–66)
SODIUM BLD-SCNC: 134 MMOL/L (ref 135–145)
TOTAL IMMATURE NEUTOROPHIL: 0.04 K/CU MM
TOTAL NUCLEATED RBC: 0 K/CU MM
TOTAL PROTEIN: 6.2 GM/DL (ref 6.4–8.2)
WBC # BLD: 10.2 K/CU MM (ref 4–10.5)

## 2022-08-22 PROCEDURE — 97530 THERAPEUTIC ACTIVITIES: CPT

## 2022-08-22 PROCEDURE — 97166 OT EVAL MOD COMPLEX 45 MIN: CPT

## 2022-08-22 PROCEDURE — 82962 GLUCOSE BLOOD TEST: CPT

## 2022-08-22 PROCEDURE — 83880 ASSAY OF NATRIURETIC PEPTIDE: CPT

## 2022-08-22 PROCEDURE — 80053 COMPREHEN METABOLIC PANEL: CPT

## 2022-08-22 PROCEDURE — 6360000002 HC RX W HCPCS: Performed by: STUDENT IN AN ORGANIZED HEALTH CARE EDUCATION/TRAINING PROGRAM

## 2022-08-22 PROCEDURE — 85025 COMPLETE CBC W/AUTO DIFF WBC: CPT

## 2022-08-22 PROCEDURE — 2580000003 HC RX 258: Performed by: ANESTHESIOLOGY

## 2022-08-22 PROCEDURE — 97535 SELF CARE MNGMENT TRAINING: CPT

## 2022-08-22 PROCEDURE — 94761 N-INVAS EAR/PLS OXIMETRY MLT: CPT

## 2022-08-22 PROCEDURE — 97162 PT EVAL MOD COMPLEX 30 MIN: CPT

## 2022-08-22 PROCEDURE — 6370000000 HC RX 637 (ALT 250 FOR IP): Performed by: STUDENT IN AN ORGANIZED HEALTH CARE EDUCATION/TRAINING PROGRAM

## 2022-08-22 PROCEDURE — 2060000000 HC ICU INTERMEDIATE R&B

## 2022-08-22 PROCEDURE — 2580000003 HC RX 258: Performed by: ORTHOPAEDIC SURGERY

## 2022-08-22 PROCEDURE — 83735 ASSAY OF MAGNESIUM: CPT

## 2022-08-22 PROCEDURE — 6370000000 HC RX 637 (ALT 250 FOR IP): Performed by: ORTHOPAEDIC SURGERY

## 2022-08-22 PROCEDURE — 6360000002 HC RX W HCPCS: Performed by: ORTHOPAEDIC SURGERY

## 2022-08-22 PROCEDURE — 36415 COLL VENOUS BLD VENIPUNCTURE: CPT

## 2022-08-22 PROCEDURE — 84100 ASSAY OF PHOSPHORUS: CPT

## 2022-08-22 RX ORDER — METHYLPREDNISOLONE 4 MG/1
6 TABLET ORAL DAILY
Status: DISCONTINUED | OUTPATIENT
Start: 2022-08-22 | End: 2022-08-25

## 2022-08-22 RX ORDER — INSULIN GLARGINE 100 [IU]/ML
10 INJECTION, SOLUTION SUBCUTANEOUS NIGHTLY
Status: DISCONTINUED | OUTPATIENT
Start: 2022-08-22 | End: 2022-08-23

## 2022-08-22 RX ORDER — INSULIN LISPRO 100 [IU]/ML
0-4 INJECTION, SOLUTION INTRAVENOUS; SUBCUTANEOUS NIGHTLY
Status: DISCONTINUED | OUTPATIENT
Start: 2022-08-22 | End: 2022-08-31 | Stop reason: HOSPADM

## 2022-08-22 RX ORDER — INSULIN LISPRO 100 [IU]/ML
0-16 INJECTION, SOLUTION INTRAVENOUS; SUBCUTANEOUS
Status: DISCONTINUED | OUTPATIENT
Start: 2022-08-22 | End: 2022-08-22

## 2022-08-22 RX ORDER — INSULIN LISPRO 100 [IU]/ML
0-16 INJECTION, SOLUTION INTRAVENOUS; SUBCUTANEOUS
Status: DISCONTINUED | OUTPATIENT
Start: 2022-08-22 | End: 2022-08-31 | Stop reason: HOSPADM

## 2022-08-22 RX ADMIN — METHYLPREDNISOLONE 6 MG: 4 TABLET ORAL at 13:57

## 2022-08-22 RX ADMIN — INSULIN GLARGINE 10 UNITS: 100 INJECTION, SOLUTION SUBCUTANEOUS at 21:16

## 2022-08-22 RX ADMIN — ENOXAPARIN SODIUM 30 MG: 100 INJECTION SUBCUTANEOUS at 08:54

## 2022-08-22 RX ADMIN — ACETAMINOPHEN 650 MG: 325 TABLET ORAL at 23:15

## 2022-08-22 RX ADMIN — SODIUM CHLORIDE, PRESERVATIVE FREE 10 ML: 5 INJECTION INTRAVENOUS at 09:38

## 2022-08-22 RX ADMIN — Medication 10 ML: at 09:38

## 2022-08-22 RX ADMIN — AMLODIPINE BESYLATE 5 MG: 5 TABLET ORAL at 09:38

## 2022-08-22 RX ADMIN — METOPROLOL SUCCINATE 25 MG: 25 TABLET, EXTENDED RELEASE ORAL at 16:45

## 2022-08-22 RX ADMIN — Medication 10 ML: at 21:18

## 2022-08-22 RX ADMIN — INSULIN LISPRO 7 UNITS: 100 INJECTION, SOLUTION INTRAVENOUS; SUBCUTANEOUS at 21:17

## 2022-08-22 RX ADMIN — LISINOPRIL 5 MG: 5 TABLET ORAL at 09:38

## 2022-08-22 RX ADMIN — INSULIN LISPRO 12 UNITS: 100 INJECTION, SOLUTION INTRAVENOUS; SUBCUTANEOUS at 09:39

## 2022-08-22 RX ADMIN — CEFAZOLIN 2000 MG: 2 INJECTION, POWDER, FOR SOLUTION INTRAMUSCULAR; INTRAVENOUS at 04:50

## 2022-08-22 RX ADMIN — BISACODYL 5 MG: 5 TABLET, COATED ORAL at 08:53

## 2022-08-22 RX ADMIN — INSULIN LISPRO 4 UNITS: 100 INJECTION, SOLUTION INTRAVENOUS; SUBCUTANEOUS at 08:54

## 2022-08-22 RX ADMIN — ENOXAPARIN SODIUM 30 MG: 100 INJECTION SUBCUTANEOUS at 21:16

## 2022-08-22 RX ADMIN — ACETAMINOPHEN 650 MG: 325 TABLET ORAL at 00:21

## 2022-08-22 RX ADMIN — ACETAMINOPHEN 650 MG: 325 TABLET ORAL at 11:00

## 2022-08-22 RX ADMIN — ACETAMINOPHEN 650 MG: 325 TABLET ORAL at 16:40

## 2022-08-22 RX ADMIN — ACETAMINOPHEN 650 MG: 325 TABLET ORAL at 06:02

## 2022-08-22 ASSESSMENT — PAIN DESCRIPTION - DESCRIPTORS: DESCRIPTORS: ACHING

## 2022-08-22 ASSESSMENT — PAIN DESCRIPTION - LOCATION
LOCATION: LEG
LOCATION: HAND

## 2022-08-22 ASSESSMENT — PAIN SCALES - GENERAL
PAINLEVEL_OUTOF10: 7
PAINLEVEL_OUTOF10: 0
PAINLEVEL_OUTOF10: 7

## 2022-08-22 ASSESSMENT — PAIN - FUNCTIONAL ASSESSMENT
PAIN_FUNCTIONAL_ASSESSMENT: ACTIVITIES ARE NOT PREVENTED
PAIN_FUNCTIONAL_ASSESSMENT: ACTIVITIES ARE NOT PREVENTED

## 2022-08-22 ASSESSMENT — PAIN DESCRIPTION - ORIENTATION: ORIENTATION: LEFT

## 2022-08-22 NOTE — PLAN OF CARE
Problem: Discharge Planning  Goal: Discharge to home or other facility with appropriate resources  Outcome: Progressing  Flowsheets (Taken 8/21/2022 2014 by Giovani Walls RN)  Discharge to home or other facility with appropriate resources:   Identify barriers to discharge with patient and caregiver   Arrange for needed discharge resources and transportation as appropriate   Identify discharge learning needs (meds, wound care, etc)   Arrange for interpreters to assist at discharge as needed   Refer to discharge planning if patient needs post-hospital services based on physician order or complex needs related to functional status, cognitive ability or social support system     Problem: Skin/Tissue Integrity  Goal: Absence of new skin breakdown  Description: 1. Monitor for areas of redness and/or skin breakdown  2. Assess vascular access sites hourly  3. Every 4-6 hours minimum:  Change oxygen saturation probe site  4. Every 4-6 hours:  If on nasal continuous positive airway pressure, respiratory therapy assess nares and determine need for appliance change or resting period.   Outcome: Progressing     Problem: Safety - Adult  Goal: Free from fall injury  Outcome: Progressing     Problem: ABCDS Injury Assessment  Goal: Absence of physical injury  Outcome: Progressing     Problem: Pain  Goal: Verbalizes/displays adequate comfort level or baseline comfort level  Outcome: Progressing  Flowsheets (Taken 8/21/2022 2011 by Giovani Walls, NA)  Verbalizes/displays adequate comfort level or baseline comfort level:   Encourage patient to monitor pain and request assistance   Assess pain using appropriate pain scale   Administer analgesics based on type and severity of pain and evaluate response   Implement non-pharmacological measures as appropriate and evaluate response   Consider cultural and social influences on pain and pain management   Notify Licensed Independent Practitioner if interventions unsuccessful or patient reports new pain

## 2022-08-22 NOTE — PROGRESS NOTES
Comprehensive Nutrition Assessment    Type and Reason for Visit:  Initial    Nutrition Recommendations/Plan:   Added no pork/gelatin to diet order. Continue high calorie nutrition supplements. Malnutrition Assessment:  Malnutrition Status:  No malnutrition (08/22/22 1301)    Context:  Acute Illness       Nutrition Assessment:    No pork products as per admission screen/pt preferences added to diet order. Pt with femur fracture s/p ORIF. Incidental finding of COVID. On CHO controlled diet with nutrition supplements and no PO Intake recorded. Will monitor at moderate nutrition risk. Nutrition Related Findings:    BG up to 318 today, Phos 2.4, Na 134. Wound Type: Surgical Incision       Current Nutrition Intake & Therapies:    Average Meal Intake: Unable to assess  Average Supplements Intake: Unable to assess  ADULT ORAL NUTRITION SUPPLEMENT; Breakfast, Lunch, Dinner; Standard High Calorie/High Protein Oral Supplement  ADULT DIET; Regular; 3 carb choices (45 gm/meal); no pork products, no gelatin    Anthropometric Measures:  Height: 5' 2\" (157.5 cm)  Ideal Body Weight (IBW): 110 lbs (50 kg)    Admission Body Weight: 153 lb 10.6 oz (69.7 kg)  Current Body Weight: 160 lb 15 oz (73 kg), 146.3 % IBW. Weight Source: Bed Scale  Current BMI (kg/m2): 29.4  Usual Body Weight:  (unknown. no report of weight loss PTA)                       BMI Categories: Overweight (BMI 25.0-29. 9)    Estimated Daily Nutrient Needs:  Energy Requirements Based On: Formula  Weight Used for Energy Requirements: Current  Energy (kcal/day): 8246-2127  Weight Used for Protein Requirements: Ideal  Protein (g/day): 60-75  Method Used for Fluid Requirements: 1 ml/kcal  Fluid (ml/day): 3976-5139    Nutrition Diagnosis:   Increased nutrient needs related to acute injury/trauma as evidenced by wounds    Nutrition Interventions:   Food and/or Nutrient Delivery: Continue Current Diet, Continue Oral Nutrition Supplement  Nutrition Education/Counseling: No recommendation at this time  Coordination of Nutrition Care: Continue to monitor while inpatient       Goals:     Goals: Meet at least 75% of estimated needs, by next RD assessment       Nutrition Monitoring and Evaluation:   Behavioral-Environmental Outcomes: None Identified  Food/Nutrient Intake Outcomes: Food and Nutrient Intake, Supplement Intake  Physical Signs/Symptoms Outcomes: Skin    Discharge Planning:    No discharge needs at this time     Litzy Esteves, [address] [place] [address],   Contact: [phone]

## 2022-08-22 NOTE — PROGRESS NOTES
Pulmonary and Critical Care  Progress Note    Subjective: The patient is doing well postop. On %. Shortness of breath none  Chest pain none  Addressing respiratory complaints Patient is negative for  hemoptysis and cyanosis  CONSTITUTIONAL:  negative for fevers and chills      Past Medical History:     has no past medical history on file. has a past surgical history that includes hip surgery (Left, 8/21/2022). reports that she has never smoked. She has never used smokeless tobacco.  Family history:  family history is not on file. Allergies   Allergen Reactions    Aspirin Anaphylaxis     Social History:    Reviewed; no changes    Objective:   PHYSICAL EXAM:        VITALS:  BP (!) 133/103   Pulse (!) 119   Temp 98.3 °F (36.8 °C)   Resp 16   Ht 5' 2\" (1.575 m)   Wt 160 lb 15 oz (73 kg)   SpO2 100%   BMI 29.44 kg/m²     24HR INTAKE/OUTPUT:    Intake/Output Summary (Last 24 hours) at 8/22/2022 1634  Last data filed at 8/22/2022 1626  Gross per 24 hour   Intake 352.98 ml   Output 1000 ml   Net -647.02 ml       CONSTITUTIONAL:  awake, alert, cooperative, no apparent distress, and appears stated age  LUNGS:  clear to auscultation   CARDIOVASCULAR:  normal S1 and S2 and negative JVD  ABD:Abdomen soft, non-tender. BS normal. No masses,  No organomegaly  NEURO:Alert. DATA:    CBC:  Recent Labs     08/20/22 2133 08/21/22  0659 08/22/22  0703   WBC 13.1* 10.2 10.2   RBC 3.60* 3.52* 2.89*   HGB 10.0* 9.7* 8.1*   HCT 31.1* 30.3* 25.3*    229 201   MCV 86.4 86.1 87.5   MCH 27.8 27.6 28.0   MCHC 32.2 32.0 32.0   RDW 14.3 14.4 14.6   SEGSPCT 77.6* 73.5* 70.4*      BMP:  Recent Labs     08/20/22 2133 08/21/22  0659 08/22/22  0703   * 132* 134*   K 4.4 4.1 4.4   CL 94* 97* 101   CO2 25 26 25   BUN 19 17 18   CREATININE 0.6 0.6 0.7   CALCIUM 10.2 9.9 9.9   GLUCOSE 350* 305* 272*      ABG:  No results for input(s): PH, PO2ART, ZGC2AZR, HCO3, BEART, O2SAT in the last 72 hours.   Lab Results   Component Value Date    PROBNP 238.4 08/22/2022     No results found for: [address] [facility]    Radiology Review:  Pertinent images / reports were reviewed as a part of this visit. Assessment:     Patient Active Problem List   Diagnosis    COVID-19    Closed displaced fracture of left femoral neck (HCC)    Femoral neck stress fracture, initial encounter       Plan:   1. Overall the patient has improved. 2. Inc. activity. 3. Discussed with the family.      Honor Ahumada, MD   8/22/2022  4:34 PM

## 2022-08-22 NOTE — PROGRESS NOTES
Occupational [address] [place] [address] ACUTE CARE OCCUPATIONAL THERAPY EVALUATION    Mercedez Hassan, 1940, 2016/2016-A, 8/22/2022    Discharge Recommendation: Inpatient Rehabilitation    History:  [place]:  There were no encounter diagnoses. Subjective:  Patient states: \"I'm here from [place] visiting my daughter for six months! \"   Pain: Pt denied pain this date at rest, voiced some Lt hip pain with movement but did not quantify  Communication with other providers: PT Qi Spencer, RN De Momin  Restrictions: General Precautions, Fall Risk, WBAT Lt LE, Droplet Plus Isolation, Telemetry, Pulse Ox, BP cuff, Bed/chair alarm    Home Setup/Prior level of function:  Social/Functional History  Lives With: Daughter  Type of Home: House  Home Layout: Two level, Bed/Bath upstairs  Home Access: Stairs to enter without rails  Entrance Stairs - Number of Steps: 3  Entrance Stairs - Rails: None  Bathroom Shower/Tub: Tub/Shower unit, Walk-in shower  Bathroom Toilet: Standard  Bathroom Accessibility: Accessible  Has the patient had two or more falls in the past year or any fall with injury in the past year?: No  ADL Assistance: Independent  Homemaking Assistance: Independent  Homemaking Responsibilities: Yes  Ambulation Assistance: Independent (uses no AD)  Transfer Assistance: Independent  Active : No  Occupation: Retired  Additional Comments: Pt is visiting her daughter for 6 months from [place]. Just got here at the beginning of August.    Examination:  Observation: Supine in bed upon arrival. Pleasant and agreeable to evaluation. Daughter present and supportive.    Vision: WFL   Hearing: [place]  Vitals: Stable vitals throughout session on room air    Body Systems and functions:  ROM: Lt UE WFL all joints, Rt UE active shoulder flexion grossly 0-80', Rt UE WFL distally   Strength: Grossly 4/5 MMT all major muscle groups BL UEs  Sensation: WFL (denies numbness/tingling)  Tone: Normal  Coordination: Significant resting and intention tremors in BL UEs (due to Parkinson's Disease per daughter)    Activities of Daily Living (ADLs):  Feeding: SBA/setup (took drinks of water and soda while seated EOB; increased difficulty this date due to resting and intention tremors)  Grooming: SBA (seated facial hygiene task EOB; unable to complete in standing this date due to Lt hip pain)  UB bathing: SBA   LB bathing: Max A (reaching distal LEs, bottom, posterior thighs)  UB dressing: CGA (light dynamic sitting balance with donning robe seated EOB)  LB dressing: Dependent (donning BL socks)  Toileting: Dependent    Cognitive and Psychosocial Functioning:  Overall cognitive status: WFL (very mild language barrier)  Affect: Normal     Balance:   Sitting: SBA static sitting, CGA with light dynamic sitting tasks  Standing: Min A with RW; slightly decreased trunk extension/upright posture    Functional Mobility:  Bed Mobility: Max A x 2 supine to sitting EOB (HOB elevated to 40', increased time/effort, max A for scooting Lt LE to edge and for uprighting trunk)  Transfers: Mod A sit to stand from bed, Mod A x 2 stand pivot transfer bed to chair (mod coaching for technique/safe hand placement with each transfer)  Ambulation: Unsafe/unable this date      AM-PAC 6 click short form for inpatient daily activity:   How much help from another person does the patient currently need. .. Unable  Dep A Lot  Max A A Lot   Mod A A Little  Min A A Little   CGA  SBA None   Mod I  Indep  Sup   1. Putting on and taking off regular lower body clothing? [x] 1    [] 2   [] 2   [] 3   [] 3   [] 4      2. Bathing (including washing, rinsing, drying)? [] 1   [x] 2   [] 2 [] 3 [] 3 [] 4   3. Toileting, which includes using toilet, bedpan, or urinal? [x] 1    [] 2   [] 2   [] 3   [] 3   [] 4     4. Putting on and taking off regular upper body clothing? [] 1   [] 2   [] 2   [] 3   [x] 3    [] 4      5. Taking care of personal grooming such as brushing teeth?  [] 1   [] 2    [] 2 [] 3 [x] 3   [] 4      6. Eating meals? [] 1   [] 2   [] 2   [] 3   [x] 3   [] 4      Raw Score: 13    [24=0% impaired(CH), 23=1-19%(CI), 20-22=20-39%(CJ), 15-19=40-59%(CK), 10-14=60-79%(CL), 7-9=80-99%(CM), 6=100%(CN)]     Treatment:  Therapeutic Activity Training:   Therapeutic activity training was instructed today. Cues were given for safety, sequence, UE/LE placement, awareness, and balance. Activities performed today included bed mobility training, sitting balance/tolerance, transfer training, standing balance/tolerance with RW, education on role of OT, POC, WBAT status Lt LE, importance of EOB/OOB activity, d/c planning and recommendations   Self Care Training:   Cues were given for safety, sequence, UE/LE placement, visual cues, and balance. Activities performed today included UB/LB dressing tasks, facial hygiene, self-feeding      Safety Measures: Gait belt used, Left in Chair, Alarm in place    Assessment:  Pt is an 81year old female with no past medical history on file. Pt admitted following a fall and diagnosed with a Lt hip intertrochanteric fracture. Pt underwent trochanteric nailing on 8-21. Pt also tested positive for COVID-19. Pt is currently visiting from [place] and staying with her daughter for the next six months. Pt is independent with ADLs, IADLs, and mobility at baseline. Pt currently presents with the above impairments, and is not safe for immediate return home. Recommend continued OT services in inpatient rehab setting at discharge. Complexity: Moderate  Prognosis: Good  Plan: 4x/week      Goals:  1. Pt will complete all aspects of bed mobility for EOB/OOB ADLs mod A with HOB flat  2. Pt will complete UB/LB bathing min A with setup using long handled sponge PRN  3. Pt will complete all aspects of LB dressing mod A with setup using AE PRN  4. Pt will complete all functional transfers to and from bed, chair, toilet, shower chair min A/good safety awareness  5.  Pt will ambulate HH distance to bathroom for toileting min A with RW  6. Pt will complete all aspects of toileting task mod A on regular toilet  7. Pt will complete oral hygiene/grooming routine in unsupported sitting EOB SBA with setup  8.  Pt will complete ther ex/ther act with focus on UE strengthening and functional sitting/standing balance and tolerance    Time:   Time in: 1015  Time out: 1056  Timed treatment minutes: 26  Total time: 41    Electronically signed by:    AMBREEN Amanda/L, DARIA, [id number]

## 2022-08-22 NOTE — PROGRESS NOTES
V2.0  [facility] [place] Hospitalist Progress Note      Name:  Carlotta Patrick /Age/Sex: 1940  (81 y.o. female)   MRN & CSN:  [id number] & [id number] Encounter Date/Time: 2022 8:27 AM EDT    Location:  -A PCP: Luly Branham MD       Hospital Day: 3    Assessment and Plan:   Carlotta Patrick is a 81 y.o. female with pmh of HTN, DM II who presents with Closed displaced fracture of left femoral neck ([address]      Plan:  #Acute fracture of left femoral neck 2/2 Fall at home, initial encounter  X-ray left hip shows fracture of left surgical neck of the femur with extension into the greater trochanter and mild shortening  S/p ORIF 2022  - Orthopedic surgery was cleared pt for discharge;            - Pain control PRN              - Antiemetics PRN              - Case management consult, PT/OT      #COVID 19   Incidental finding, asymptomatic. No URI symptoms. CXR shows mild apical opacities typical for Covid-19.               -Albuterol as needed for wheezing              -pulm following pt; per their recs, pt was started on steroids and barcitinib   - check CMP tomorrow     #Essential hypertension              Continue home Norvasc, lisinopril, Toprol-XL     #acute anemia. Likely post-op today  8.1 from 10.0 on admission. Hemodynamically stable. - monitor    #Non-insulin-dependent type 2 diabetes mellitus  HA1C  7.7 2022. On home oral metformin, gliclazid. Pt glucose normally controlled at home but presented with glucose 300's. Likely 2/2 stress and now steroids. - change sliding scale to HDSSI  - will likely start basal insulin if no improvement  - hypoglycemia protocol  - POCT glucose 4times daily       Diet ADULT ORAL NUTRITION SUPPLEMENT; Breakfast, Lunch, Dinner; Standard High Calorie/High Protein Oral Supplement  ADULT DIET;  Regular; 3 carb choices (45 gm/meal)   DVT Prophylaxis [x] Lovenox, []  Heparin, [] SCDs, [] Ambulation,  [] Eliquis, [] Xarelto  [] Coumadin   Code Status Full Code   Disposition From: home  Expected Disposition: TBD  Estimated Date of Discharge: TBD  Patient requires continued admission due to PT/OT, placement   Surrogate Decision Maker/ POA      Subjective:     Chief Complaint: fall   Pt had ORIF surgery which she tolerated very well. Patient seen and examined at bedside. She denies any pain. Last BM was 2 days ago. Denies lightheadedness, dizziness, fever, night sweats, chills, chest pain, SOB, cough, palpitations, abd pain, nausea, vomiting, diarrhea, dysuria. Review of Systems:    Review of Systems    See above      Objective: Intake/Output Summary (Last 24 hours) at 8/22/2022 0996  Last data filed at 8/22/2022 0453  Gross per 24 hour   Intake 402.98 ml   Output 500 ml   Net -97.02 ml        Vitals:   Vitals:    08/22/22 0852   BP: 136/68   Pulse: (!) 106   Resp: 19   Temp: 97.9 °F (36.6 °C)   SpO2: 98%       Physical Exam:     General: NAD  Eyes: EOMI  ENT: neck supple  Cardiovascular: Regular rate. Respiratory: Clear to auscultation  Gastrointestinal: Soft, non tender  Genitourinary: no suprapubic tenderness  Musculoskeletal:  Neurovascularly intact to gross sensation and touch in lower extremities, able to move legs, dressing in place, clean and dry . Skin: warm, dry  Neuro: Alert. Psych: Mood appropriate.      Medications:   Medications:    insulin lispro  0-4 Units SubCUTAneous Nightly    insulin lispro  0-16 Units SubCUTAneous TID WC    methylPREDNISolone  6 mg Oral Daily    baricitinib  4 mg Oral Daily    sodium chloride flush  5-40 mL IntraVENous 2 times per day    enoxaparin  30 mg SubCUTAneous BID    acetaminophen  650 mg Oral Q6H    sodium chloride flush  5-40 mL IntraVENous 2 times per day    amLODIPine  5 mg Oral Daily    lisinopril  5 mg Oral Daily    metoprolol succinate  25 mg Oral QPM    bisacodyl  5 mg Oral Daily      Infusions:    sodium chloride      sodium chloride      dextrose      sodium chloride 100 mL/hr at 08/22/22 7855     PRN Meds: sodium chloride flush, 5-40 mL, PRN  sodium chloride, , PRN  HYDROmorphone, 0.25 mg, Q3H PRN   Or  HYDROmorphone, 0.5 mg, Q3H PRN  oxyCODONE, 5 mg, Q4H PRN   Or  oxyCODONE, 10 mg, Q4H PRN  ondansetron, 4 mg, Q8H PRN   Or  ondansetron, 4 mg, Q6H PRN  sodium chloride flush, 5-40 mL, PRN  sodium chloride, 25 mL, PRN  glucose, 4 tablet, PRN  dextrose bolus, 125 mL, PRN   Or  dextrose bolus, 250 mL, PRN  glucagon (rDNA), 1 mg, PRN  dextrose, , Continuous PRN  sodium chloride flush, 5-40 mL, PRN  sodium chloride, , PRN  polyethylene glycol, 17 g, Daily PRN  guaiFENesin-dextromethorphan, 5 mL, Q4H PRN      Labs      Recent Results (from the past 24 hour(s))   POCT Glucose    Collection Time: 08/21/22  9:49 AM   Result Value Ref Range    POC Glucose 260 (H) 70 - 99 MG/DL   POCT Glucose    Collection Time: 08/21/22  2:06 PM   Result Value Ref Range    POC Glucose 244 (H) 70 - 99 MG/DL   POCT Glucose    Collection Time: 08/21/22  5:22 PM   Result Value Ref Range    POC Glucose 327 (H) 70 - 99 MG/DL   POCT Glucose    Collection Time: 08/21/22  8:28 PM   Result Value Ref Range    POC Glucose 389 (H) 70 - 99 MG/DL   CBC with Auto Differential    Collection Time: 08/22/22  7:03 AM   Result Value Ref Range    WBC 10.2 4.0 - 10.5 K/CU MM    RBC 2.89 (L) 4.2 - 5.4 M/CU MM    Hemoglobin 8.1 (L) 12.5 - 16.0 GM/DL    Hematocrit 25.3 (L) 37 - 47 %    MCV 87.5 78 - 100 FL    MCH 28.0 27 - 31 PG    MCHC 32.0 32.0 - 36.0 %    RDW 14.6 11.7 - 14.9 %    Platelets 244 387 - 599 K/CU MM    MPV 9.3 7.5 - 11.1 FL    Differential Type AUTOMATED DIFFERENTIAL     Segs Relative 70.4 (H) 36 - 66 %    Lymphocytes % 20.1 (L) 24 - 44 %    Monocytes % 9.0 (H) 0 - 4 %    Eosinophils % 0.0 0 - 3 %    Basophils % 0.1 0 - 1 %    Segs Absolute 7.2 K/CU MM    Lymphocytes Absolute 2.1 K/CU MM    Monocytes Absolute 0.9 K/CU MM    Eosinophils Absolute 0.0 K/CU MM    Basophils Absolute 0.0 K/CU MM    Nucleated RBC % 0.0 %    Total Nucleated RBC 0.0 K/CU MM    Total Immature Neutrophil 0.04 K/CU MM    Immature Neutrophil % 0.4 0 - 0.43 %   Phosphorus    Collection Time: 08/22/22  7:03 AM   Result Value Ref Range    Phosphorus 2.4 (L) 2.5 - 4.9 MG/DL   Magnesium    Collection Time: 08/22/22  7:03 AM   Result Value Ref Range    Magnesium 2.0 1.8 - 2.4 mg/dl   Comprehensive Metabolic Panel    Collection Time: 08/22/22  7:03 AM   Result Value Ref Range    Sodium 134 (L) 135 - 145 MMOL/L    Potassium 4.4 3.5 - 5.1 MMOL/L    Chloride 101 99 - 110 mMol/L    CO2 25 21 - 32 MMOL/L    BUN 18 6 - 23 MG/DL    Creatinine 0.7 0.6 - 1.1 MG/DL    Glucose 272 (H) 70 - 99 MG/DL    Calcium 9.9 8.3 - 10.6 MG/DL    Albumin 3.8 3.4 - 5.0 GM/DL    Total Protein 6.2 (L) 6.4 - 8.2 GM/DL    Total Bilirubin 0.3 0.0 - 1.0 MG/DL    ALT 15 10 - 40 U/L    AST 17 15 - 37 IU/L    Alkaline Phosphatase 66 40 - 128 IU/L    GFR Non-African American >60 >60 mL/min/1.73m2    GFR African American >60 >60 mL/min/1.73m2    Anion Gap 8 4 - 16   Brain Natriuretic Peptide    Collection Time: 08/22/22  7:03 AM   Result Value Ref Range    Pro-.4 <300 PG/ML   POCT Glucose    Collection Time: 08/22/22  7:39 AM   Result Value Ref Range    POC Glucose 254 (H) 70 - 99 MG/DL        Imaging/Diagnostics Last 24 Hours   XR CHEST PORTABLE    Result Date: 8/20/2022  EXAMINATION: ONE XRAY VIEW OF THE CHEST 8/20/2022 6:27 pm COMPARISON: None. HISTORY: ORDERING SYSTEM PROVIDED HISTORY: COVID 19 +, TECHNOLOGIST PROVIDED HISTORY: Reason for exam:->COVID 23 +, Reason for Exam: covid 23 + FINDINGS: Overlying items external to the patient somewhat limit evaluation. Mild hazy airspace opacities bilaterally to upper lung zones. No pleural effusions or pneumothoraces. Cardiac and mediastinal silhouettes are within normal limits. No acute bony abnormalities. Mild hazy airspace opacities to bilateral upper lung zones, nonspecific but would be compatible with COVID-19 pneumonia given the clinical history.        Electronically signed by Mora Sanchez MD on 8/22/2022 at 9:27 AM

## 2022-08-22 NOTE — CARE COORDINATION
New, CM reviewed notes . CM consult for poss placement. CM placed a white board.  [address]

## 2022-08-22 NOTE — PROGRESS NOTES
Physical Therapy  [facility] ACUTE CARE PHYSICAL THERAPY EVALUATION  Caryl Douglass, 1940, 2016/2016-A, 8/22/2022    History  [place]:  There were no encounter diagnoses. Patient  has no past medical history on file. Patient  has a past surgical history that includes hip surgery (Left, 8/21/2022). Subjective:  Patient states: \"I'm nervous\"     Pain:  denies pain upon arrival at rest. Increased pain slightly with mobility. Nursing in to provide IV pain medication. Pt stated the pain was tolerable in her left hip, she was more nervous about it hurting. Communication with other providers:  Handoff to RN, co-eval with Sonia BOLES   Restrictions: droplet plus, WBAT LLE     Home Setup/Prior level of function  Social/Functional History  Lives With: Daughter  Type of Home: House  Home Layout: Two level, Bed/Bath upstairs  Home Access: Stairs to enter without rails  Entrance Stairs - Number of Steps: 3  Entrance Stairs - Rails: None  Bathroom Shower/Tub: Tub/Shower unit, Walk-in shower  Bathroom Toilet: Standard  Bathroom Accessibility: Accessible  Has the patient had two or more falls in the past year or any fall with injury in the past year?: No  ADL Assistance: [facility] [address]: Independent  Homemaking Responsibilities: Yes  Ambulation Assistance: Independent (uses no AD)  Transfer Assistance: Independent  Active : No  Occupation: Retired  Additional Comments: Pt is visiting her daughter for 6 months from [place]    Examination of body systems (includes body structures/functions, activity/participation limitations):  Observation:  Supine in bed upon arrival. Cooperative with therapy. Very fearful of left leg hurting with mobility needing inc time and encouragement.  Daughter visiting and supportive   Vision:  WFL  Hearing:  [employer] [place] [employer] [place]   Cardiopulmonary:  stable vitals on room air     Musculoskeletal  ROM R/L:  WFL RLE, L limited at hip in all planes due to pain     Strength R/L:  RLE 4-/5, LLE 3-/5     Neuro:  Hx of PD with BUE tremors     Mobility/treatment:   Rolling L/R:  NT   Supine to sit:  maxA x 2 for LLE advancement, scooting hips, and uprighting trunk. Slow and incremental with rest breaks due to pain   Transfers:   Sit to stand: modA from EOB (2x) for lift and fwd weight shift assist. VCs for sequencing UEs from bed to RW with good carry over  Stand to sit: Darell to EOB for controlling sit  Squat pivot: modA x 2 for fwd weight shift, lift, and facilitating turn at her hips. Needed inc time to build confidence to transfer. Very fearful   Sitting balance:   CGA progressing to SBA for safety at EOB x 10 minutes. Static and light dynamic with at least single UE support   Standing balance:  x ~10-20 seconds at [employer] x2, static stance with BUE support progressing to small weight shifts laterally with sliding feet minimally (within FARA). Unable to advance to steps at this time   Gait: unable to tolerate  Educated pt and dtr on POC, role of PT, DME, discharge. Cues provided for sequencing to inc safety and indep with mobility     [facility] 6 Clicks Inpatient Mobility:  AM-PAC Inpatient Mobility Raw Score : 11    Safety: patient left in chair with alarm, call light within reach, RN notified, gait belt used. Assessment:  Pt is an 81year old female admitted after a recent fall sustaining a left femur fx. She is s/p ORIF on 8/21. Incidental covid-19 finding. Recommend ARU once medically stable. At baseline she is indep with gross mobility and ADLs. She performed below baseline this date with dec strength, balance,and activity tolerance. She would benefit from continued therapy to address her current deficits ,dec potential fall risk, and restore function   Complexity: Moderate  Prognosis: Good, no significant barriers to participation at this time.    Plan: 6-7 times per week   Discharge Recommendations: IP Rehab  Equipment: continue to assess at next level of care     Goals:  Short Term Goals  Time Frame for Short term goals: 2 weeks  Short term goal 1: Pt will perform sit><supine modA  Short term goal 2: Pt will transfer sit><stand Darell  Short term goal 3: Pt will transfer between surfaces Darell  Short term goal 4: Pt will ambulate 30ft with RW Darell       Treatment plan:  Bed mobility, transfers, balance, gait, TA, TX, stairs     Recommendations for NURSING mobility: squat pivot, 2 person     Time:   Time in: 1015  Time out: 1056  Timed treatment minutes: 26 minutes   Total time: 41 minutes     Electronically signed by:    Finas Hodgkins, [id number]  8/22/2022, 12:36 PM

## 2022-08-22 NOTE — PROGRESS NOTES
Rosenda Rios (1940)    Daily Progress Note-  Edison Adkins MD               Today's Date:     8/22/2022          Subjective:      Awake and alert this am  Pain rated pain is perceived as mild (1-3  pain scale)  Denies shortness of breath or chest pain. Objective:   Patient Vitals for the past 4 hrs:   BP Temp Pulse Resp SpO2 Weight   08/22/22 0400 (!) 106/45 97.5 °F (36.4 °C) 80 13 100 % 160 lb 15 oz (73 kg)     I/O last 3 completed shifts: In: 26 [P.O.:300; I.V.:50; IV Piggyback:53]  Out: 500 [Urine:500]  DRAIN/TUBE OUTPUT:       Physical Exam:   Orientation:  alert and oriented to person, place and time    Left Lower Extremity    Incision:  dressing in place, clean, dry, and intact    Lower Extremity Motor :    Moving lower extremities without difficulty today. Able to dorsiflex and   plantar flex foot/ankle. Lower Extremity Sensory:   Neurovascularly intact to gross sensation and touch in lower extremities. Pulses:    present 2+ bilaterally lower extremities.       LABS   CBC:   Recent Labs     08/20/22 2133 08/21/22  0659 08/22/22  0703   WBC 13.1* 10.2 10.2   HGB 10.0* 9.7* 8.1*    229 201     BMP:    Recent Labs     08/20/22 2133 08/21/22  0659 08/22/22  0703   * 132* 134*   K 4.4 4.1 4.4   CL 94* 97* 101   CO2 25 26 25   BUN 19 17 18   CREATININE 0.6 0.6 0.7   GLUCOSE 350* 305* 272*         Medications   Meds:    insulin lispro  0-8 Units SubCUTAneous TID WC    insulin lispro  0-4 Units SubCUTAneous Nightly    methylPREDNISolone  6 mg Oral Daily    baricitinib  4 mg Oral Daily    sodium chloride flush  5-40 mL IntraVENous 2 times per day    enoxaparin  30 mg SubCUTAneous BID    acetaminophen  650 mg Oral Q6H    sodium chloride flush  5-40 mL IntraVENous 2 times per day    amLODIPine  5 mg Oral Daily    lisinopril  5 mg Oral Daily    metoprolol succinate  25 mg Oral QPM    bisacodyl  5 mg Oral Daily       Assessment and Plan     1. Post operative day # 1 Left Hip ORIF (8/21/22)    1:  Mobilize with Physical therapy   -WBAT  2. Acute blood loss anemia, not a complication   -Hb 8.1   -Repeat in am  3:  Continue Deep venous thrombosis prophylaxis (increased risk due to + COVID test)    -Chemoprophylaxis Lovenox   -Mechanical-GRISEL hose, -SCD's, ambulation  4:  Continue Pain Control   -wean to PO meds  5.   D/C Planning:     -Home Health vs SNU   -OK to D/C per modesta Phillips MD

## 2022-08-23 ENCOUNTER — APPOINTMENT (OUTPATIENT)
Dept: GENERAL RADIOLOGY | Age: 82
DRG: 480 | End: 2022-08-23
Attending: INTERNAL MEDICINE
Payer: COMMERCIAL

## 2022-08-23 LAB
ALBUMIN SERPL-MCNC: 3.2 GM/DL (ref 3.4–5)
ALP BLD-CCNC: 60 IU/L (ref 40–128)
ALT SERPL-CCNC: 8 U/L (ref 10–40)
ANION GAP SERPL CALCULATED.3IONS-SCNC: 6 MMOL/L (ref 4–16)
AST SERPL-CCNC: 12 IU/L (ref 15–37)
BASOPHILS ABSOLUTE: 0.1 K/CU MM
BASOPHILS RELATIVE PERCENT: 0.5 % (ref 0–1)
BILIRUB SERPL-MCNC: 0.3 MG/DL (ref 0–1)
BUN BLDV-MCNC: 22 MG/DL (ref 6–23)
CALCIUM SERPL-MCNC: 9.3 MG/DL (ref 8.3–10.6)
CHLORIDE BLD-SCNC: 104 MMOL/L (ref 99–110)
CO2: 27 MMOL/L (ref 21–32)
CREAT SERPL-MCNC: 0.7 MG/DL (ref 0.6–1.1)
DIFFERENTIAL TYPE: ABNORMAL
EOSINOPHILS ABSOLUTE: 0.3 K/CU MM
EOSINOPHILS RELATIVE PERCENT: 3.2 % (ref 0–3)
GFR AFRICAN AMERICAN: >60 ML/MIN/1.73M2
GFR NON-AFRICAN AMERICAN: >60 ML/MIN/1.73M2
GLUCOSE BLD-MCNC: 248 MG/DL (ref 70–99)
GLUCOSE BLD-MCNC: 274 MG/DL (ref 70–99)
GLUCOSE BLD-MCNC: 358 MG/DL (ref 70–99)
GLUCOSE BLD-MCNC: 365 MG/DL (ref 70–99)
GLUCOSE BLD-MCNC: 384 MG/DL (ref 70–99)
HCT VFR BLD CALC: 22.7 % (ref 37–47)
HCT VFR BLD CALC: 24.4 % (ref 37–47)
HEMOGLOBIN: 7.2 GM/DL (ref 12.5–16)
HEMOGLOBIN: 7.7 GM/DL (ref 12.5–16)
HIGH SENSITIVE C-REACTIVE PROTEIN: 54.7 MG/L
IMMATURE NEUTROPHIL %: 0.4 % (ref 0–0.43)
LYMPHOCYTES ABSOLUTE: 3.2 K/CU MM
LYMPHOCYTES RELATIVE PERCENT: 34.1 % (ref 24–44)
MCH RBC QN AUTO: 28.1 PG (ref 27–31)
MCHC RBC AUTO-ENTMCNC: 31.7 % (ref 32–36)
MCV RBC AUTO: 88.7 FL (ref 78–100)
MONOCYTES ABSOLUTE: 0.9 K/CU MM
MONOCYTES RELATIVE PERCENT: 10.1 % (ref 0–4)
NUCLEATED RBC %: 0 %
PDW BLD-RTO: 14.6 % (ref 11.7–14.9)
PLATELET # BLD: 185 K/CU MM (ref 140–440)
PMV BLD AUTO: 10.2 FL (ref 7.5–11.1)
POTASSIUM SERPL-SCNC: 4.3 MMOL/L (ref 3.5–5.1)
RBC # BLD: 2.56 M/CU MM (ref 4.2–5.4)
SEGMENTED NEUTROPHILS ABSOLUTE COUNT: 4.8 K/CU MM
SEGMENTED NEUTROPHILS RELATIVE PERCENT: 51.7 % (ref 36–66)
SODIUM BLD-SCNC: 137 MMOL/L (ref 135–145)
TOTAL IMMATURE NEUTOROPHIL: 0.04 K/CU MM
TOTAL NUCLEATED RBC: 0 K/CU MM
TOTAL PROTEIN: 5.4 GM/DL (ref 6.4–8.2)
WBC # BLD: 9.3 K/CU MM (ref 4–10.5)

## 2022-08-23 PROCEDURE — 80053 COMPREHEN METABOLIC PANEL: CPT

## 2022-08-23 PROCEDURE — 94150 VITAL CAPACITY TEST: CPT

## 2022-08-23 PROCEDURE — 97116 GAIT TRAINING THERAPY: CPT

## 2022-08-23 PROCEDURE — 71045 X-RAY EXAM CHEST 1 VIEW: CPT

## 2022-08-23 PROCEDURE — 2060000000 HC ICU INTERMEDIATE R&B

## 2022-08-23 PROCEDURE — 36415 COLL VENOUS BLD VENIPUNCTURE: CPT

## 2022-08-23 PROCEDURE — 94761 N-INVAS EAR/PLS OXIMETRY MLT: CPT

## 2022-08-23 PROCEDURE — 97530 THERAPEUTIC ACTIVITIES: CPT

## 2022-08-23 PROCEDURE — 97535 SELF CARE MNGMENT TRAINING: CPT

## 2022-08-23 PROCEDURE — 6370000000 HC RX 637 (ALT 250 FOR IP): Performed by: STUDENT IN AN ORGANIZED HEALTH CARE EDUCATION/TRAINING PROGRAM

## 2022-08-23 PROCEDURE — 6370000000 HC RX 637 (ALT 250 FOR IP): Performed by: ORTHOPAEDIC SURGERY

## 2022-08-23 PROCEDURE — 2580000003 HC RX 258: Performed by: ORTHOPAEDIC SURGERY

## 2022-08-23 PROCEDURE — 6360000002 HC RX W HCPCS: Performed by: ORTHOPAEDIC SURGERY

## 2022-08-23 PROCEDURE — 85018 HEMOGLOBIN: CPT

## 2022-08-23 PROCEDURE — 86141 C-REACTIVE PROTEIN HS: CPT

## 2022-08-23 PROCEDURE — 82962 GLUCOSE BLOOD TEST: CPT

## 2022-08-23 PROCEDURE — 85025 COMPLETE CBC W/AUTO DIFF WBC: CPT

## 2022-08-23 PROCEDURE — 85014 HEMATOCRIT: CPT

## 2022-08-23 PROCEDURE — 2580000003 HC RX 258: Performed by: ANESTHESIOLOGY

## 2022-08-23 PROCEDURE — 1200000000 HC SEMI PRIVATE

## 2022-08-23 PROCEDURE — 94664 DEMO&/EVAL PT USE INHALER: CPT

## 2022-08-23 PROCEDURE — 6360000002 HC RX W HCPCS: Performed by: STUDENT IN AN ORGANIZED HEALTH CARE EDUCATION/TRAINING PROGRAM

## 2022-08-23 RX ORDER — INSULIN LISPRO 100 [IU]/ML
7 INJECTION, SOLUTION INTRAVENOUS; SUBCUTANEOUS EVERY 8 HOURS
Status: DISCONTINUED | OUTPATIENT
Start: 2022-08-24 | End: 2022-08-23

## 2022-08-23 RX ORDER — INSULIN GLARGINE 100 [IU]/ML
13 INJECTION, SOLUTION SUBCUTANEOUS NIGHTLY
Status: DISCONTINUED | OUTPATIENT
Start: 2022-08-23 | End: 2022-08-23

## 2022-08-23 RX ORDER — INSULIN GLARGINE 100 [IU]/ML
15 INJECTION, SOLUTION SUBCUTANEOUS NIGHTLY
Status: DISCONTINUED | OUTPATIENT
Start: 2022-08-23 | End: 2022-08-24

## 2022-08-23 RX ORDER — INSULIN LISPRO 100 [IU]/ML
7 INJECTION, SOLUTION INTRAVENOUS; SUBCUTANEOUS EVERY 8 HOURS
Status: DISCONTINUED | OUTPATIENT
Start: 2022-08-23 | End: 2022-08-24

## 2022-08-23 RX ORDER — INSULIN LISPRO 100 [IU]/ML
5 INJECTION, SOLUTION INTRAVENOUS; SUBCUTANEOUS EVERY 8 HOURS
Status: DISCONTINUED | OUTPATIENT
Start: 2022-08-23 | End: 2022-08-23

## 2022-08-23 RX ADMIN — METOPROLOL SUCCINATE 25 MG: 25 TABLET, EXTENDED RELEASE ORAL at 18:28

## 2022-08-23 RX ADMIN — INSULIN LISPRO 5 UNITS: 100 INJECTION, SOLUTION INTRAVENOUS; SUBCUTANEOUS at 09:14

## 2022-08-23 RX ADMIN — INSULIN LISPRO 8 UNITS: 100 INJECTION, SOLUTION INTRAVENOUS; SUBCUTANEOUS at 18:23

## 2022-08-23 RX ADMIN — ENOXAPARIN SODIUM 30 MG: 100 INJECTION SUBCUTANEOUS at 08:25

## 2022-08-23 RX ADMIN — INSULIN LISPRO 4 UNITS: 100 INJECTION, SOLUTION INTRAVENOUS; SUBCUTANEOUS at 21:15

## 2022-08-23 RX ADMIN — ENOXAPARIN SODIUM 30 MG: 100 INJECTION SUBCUTANEOUS at 21:12

## 2022-08-23 RX ADMIN — INSULIN LISPRO 16 UNITS: 100 INJECTION, SOLUTION INTRAVENOUS; SUBCUTANEOUS at 13:12

## 2022-08-23 RX ADMIN — AMLODIPINE BESYLATE 5 MG: 5 TABLET ORAL at 08:24

## 2022-08-23 RX ADMIN — ACETAMINOPHEN 650 MG: 325 TABLET ORAL at 18:28

## 2022-08-23 RX ADMIN — SODIUM CHLORIDE, PRESERVATIVE FREE 10 ML: 5 INJECTION INTRAVENOUS at 08:26

## 2022-08-23 RX ADMIN — INSULIN LISPRO 16 UNITS: 100 INJECTION, SOLUTION INTRAVENOUS; SUBCUTANEOUS at 09:14

## 2022-08-23 RX ADMIN — BARICITINIB 4 MG: 2 TABLET, FILM COATED ORAL at 08:24

## 2022-08-23 RX ADMIN — OXYCODONE HYDROCHLORIDE 5 MG: 5 TABLET ORAL at 21:12

## 2022-08-23 RX ADMIN — Medication 10 ML: at 08:26

## 2022-08-23 RX ADMIN — LISINOPRIL 5 MG: 5 TABLET ORAL at 08:25

## 2022-08-23 RX ADMIN — METHYLPREDNISOLONE 6 MG: 4 TABLET ORAL at 08:30

## 2022-08-23 RX ADMIN — OXYCODONE HYDROCHLORIDE 5 MG: 5 TABLET ORAL at 10:21

## 2022-08-23 RX ADMIN — INSULIN GLARGINE 15 UNITS: 100 INJECTION, SOLUTION SUBCUTANEOUS at 21:15

## 2022-08-23 RX ADMIN — BISACODYL 5 MG: 5 TABLET, COATED ORAL at 08:25

## 2022-08-23 RX ADMIN — INSULIN LISPRO 7 UNITS: 100 INJECTION, SOLUTION INTRAVENOUS; SUBCUTANEOUS at 18:23

## 2022-08-23 RX ADMIN — ACETAMINOPHEN 650 MG: 325 TABLET ORAL at 10:21

## 2022-08-23 RX ADMIN — ACETAMINOPHEN 650 MG: 325 TABLET ORAL at 05:31

## 2022-08-23 RX ADMIN — SODIUM CHLORIDE, PRESERVATIVE FREE 10 ML: 5 INJECTION INTRAVENOUS at 21:28

## 2022-08-23 ASSESSMENT — PAIN SCALES - GENERAL: PAINLEVEL_OUTOF10: 0

## 2022-08-23 NOTE — PROGRESS NOTES
V2.0  [facility] [place]      Name:  Cata Hernandez /Age/Sex: 1940  (81 y.o. female)   MRN & CSN:  [id number] & [id number] Encounter Date/Time: 2022 8:27 AM EDT    Location:  -A PCP: Bessie Chau MD       Hospital Day: 4    Assessment and Plan:   Cata Hernandez is a 81 y.o. female with pmh of HTN, DM II who presents with Closed displaced fracture of left femoral neck ([address]      Plan:  #Acute fracture of left femoral neck 2/2 Fall at home, initial encounter  X-ray left hip shows fracture of left surgical neck of the femur with extension into the greater trochanter and mild shortening  S/p ORIF 2022  - Orthopedic surgery was cleared pt for discharge;              - Pain control PRN              - Antiemetics PRN              - Case management consult, PT/OT      #COVID 19   Incidental finding, asymptomatic. No URI symptoms. CXR shows mild apical opacities typical for Covid-19 resolved on repeat CXR. -Albuterol as needed for wheezing              -pulm following pt; per their recs, pt was started on steroids and barcitinib       #Essential hypertension              Continue home Norvasc, lisinopril, Toprol-XL     #acute anemia. Likely post-op today 7.2 from 8.1 from 10.0 on admission. Hemodynamically stable but mildly tachycardic 100-105. No sign of hematoma. - check H&H this afternoon  - order iron studies tomorrow AM    #Non-insulin-dependent type 2 diabetes mellitus  HA1C  7.7 2022. On home oral metformin, sitagliptin, gliclazid. Pt glucose normally controlled at home but presented with glucose 300's. Likely 2/2 stress and now steroids. - continue HDSSI  - increase lantus to 15U  - start 7U TID  - hypoglycemia protocol  - POCT glucose 4times daily       Diet ADULT ORAL NUTRITION SUPPLEMENT; Breakfast, Lunch, Dinner; Standard High Calorie/High Protein Oral Supplement  ADULT DIET;  Regular; 3 carb choices (45 gm/meal); no pork products, no gelatin   DVT Prophylaxis [x] Lovenox, []  Heparin, [] SCDs, [] Ambulation,  [] Eliquis, [] Xarelto  [] Coumadin   Code Status Full Code   Disposition From: home  Expected Disposition: TBD  Estimated Date of Discharge: TBD  Patient requires continued admission due to PT/OT, placement   Surrogate Decision Maker/ POA      Subjective:     Chief Complaint: fall    Patient seen and examined at bedside. She denies any pain. She had BM today. Pt states she has pain only on ambulation. Denies lightheadedness, dizziness, fever, night sweats, chills, chest pain, SOB, cough, palpitations, abd pain, nausea, vomiting, diarrhea, dysuria. Review of Systems:    Review of Systems    See above      Objective: Intake/Output Summary (Last 24 hours) at 8/23/2022 1727  Last data filed at 8/23/2022 0500  Gross per 24 hour   Intake 360 ml   Output 1200 ml   Net -840 ml          Vitals:   Vitals:    08/23/22 1338   BP:    Pulse: (!) 107   Resp: 11   Temp:    SpO2: 97%       Physical Exam:     General: NAD  Eyes: EOMI  ENT: neck supple  Cardiovascular: Regular rate. Respiratory: Clear to auscultation  Gastrointestinal: Soft, non tender  Genitourinary: no suprapubic tenderness  Musculoskeletal:  Neurovascularly intact to gross sensation and touch in lower extremities, able to move legs, dressing in place, clean and dry . Skin: warm, dry  Neuro: Alert. Psych: Mood appropriate.      Medications:   Medications:    [START ON 8/24/2022] insulin lispro  7 Units SubCUTAneous q8h    insulin glargine  15 Units SubCUTAneous Nightly    insulin lispro  0-4 Units SubCUTAneous Nightly    insulin lispro  0-16 Units SubCUTAneous TID WC    methylPREDNISolone  6 mg Oral Daily    baricitinib  4 mg Oral Daily    sodium chloride flush  5-40 mL IntraVENous 2 times per day    enoxaparin  30 mg SubCUTAneous BID    acetaminophen  650 mg Oral Q6H    sodium chloride flush  5-40 mL IntraVENous 2 times per day    amLODIPine  5 mg Oral Daily    lisinopril  5 mg Oral Daily    metoprolol succinate  25 mg Oral QPM    bisacodyl  5 mg Oral Daily      Infusions:    sodium chloride      sodium chloride      dextrose      sodium chloride 100 mL/hr at 08/22/22 0449     PRN Meds: sodium chloride flush, 5-40 mL, PRN  sodium chloride, , PRN  HYDROmorphone, 0.25 mg, Q3H PRN   Or  HYDROmorphone, 0.5 mg, Q3H PRN  oxyCODONE, 5 mg, Q4H PRN   Or  oxyCODONE, 10 mg, Q4H PRN  ondansetron, 4 mg, Q8H PRN   Or  ondansetron, 4 mg, Q6H PRN  sodium chloride flush, 5-40 mL, PRN  sodium chloride, 25 mL, PRN  glucose, 4 tablet, PRN  dextrose bolus, 125 mL, PRN   Or  dextrose bolus, 250 mL, PRN  glucagon (rDNA), 1 mg, PRN  dextrose, , Continuous PRN  sodium chloride flush, 5-40 mL, PRN  sodium chloride, , PRN  polyethylene glycol, 17 g, Daily PRN  guaiFENesin-dextromethorphan, 5 mL, Q4H PRN      Labs      Recent Results (from the past 24 hour(s))   POCT Glucose    Collection Time: 08/22/22  8:20 PM   Result Value Ref Range    POC Glucose 396 (H) 70 - 99 MG/DL   POCT Glucose    Collection Time: 08/22/22 11:36 PM   Result Value Ref Range    POC Glucose 354 (H) 70 - 99 MG/DL   CBC auto differential    Collection Time: 08/23/22  4:32 AM   Result Value Ref Range    WBC 9.3 4.0 - 10.5 K/CU MM    RBC 2.56 (L) 4.2 - 5.4 M/CU MM    Hemoglobin 7.2 (L) 12.5 - 16.0 GM/DL    Hematocrit 22.7 (L) 37 - 47 %    MCV 88.7 78 - 100 FL    MCH 28.1 27 - 31 PG    MCHC 31.7 (L) 32.0 - 36.0 %    RDW 14.6 11.7 - 14.9 %    Platelets 574 631 - 360 K/CU MM    MPV 10.2 7.5 - 11.1 FL    Differential Type AUTOMATED DIFFERENTIAL     Segs Relative 51.7 36 - 66 %    Lymphocytes % 34.1 24 - 44 %    Monocytes % 10.1 (H) 0 - 4 %    Eosinophils % 3.2 (H) 0 - 3 %    Basophils % 0.5 0 - 1 %    Segs Absolute 4.8 K/CU MM    Lymphocytes Absolute 3.2 K/CU MM    Monocytes Absolute 0.9 K/CU MM    Eosinophils Absolute 0.3 K/CU MM    Basophils Absolute 0.1 K/CU MM    Nucleated RBC % 0.0 %    Total Nucleated RBC 0.0 K/CU MM    Total Immature Neutrophil 0.04 K/CU MM    Immature Neutrophil % 0.4 0 - 0.43 %   C-Reactive Protein    Collection Time: 08/23/22  4:32 AM   Result Value Ref Range    CRP, High Sensitivity 54.7 mg/L   Comprehensive Metabolic Panel w/ Reflex to MG    Collection Time: 08/23/22  4:32 AM   Result Value Ref Range    Sodium 137 135 - 145 MMOL/L    Potassium 4.3 3.5 - 5.1 MMOL/L    Chloride 104 99 - 110 mMol/L    CO2 27 21 - 32 MMOL/L    BUN 22 6 - 23 MG/DL    Creatinine 0.7 0.6 - 1.1 MG/DL    Glucose 248 (H) 70 - 99 MG/DL    Calcium 9.3 8.3 - 10.6 MG/DL    Albumin 3.2 (L) 3.4 - 5.0 GM/DL    Total Protein 5.4 (L) 6.4 - 8.2 GM/DL    Total Bilirubin 0.3 0.0 - 1.0 MG/DL    ALT 8 (L) 10 - 40 U/L    AST 12 (L) 15 - 37 IU/L    Alkaline Phosphatase 60 40 - 128 IU/L    GFR Non-African American >60 >60 mL/min/1.73m2    GFR African American >60 >60 mL/min/1.73m2    Anion Gap 6 4 - 16   POCT Glucose    Collection Time: 08/23/22  8:51 AM   Result Value Ref Range    POC Glucose 358 (H) 70 - 99 MG/DL   POCT Glucose    Collection Time: 08/23/22 12:30 PM   Result Value Ref Range    POC Glucose 365 (H) 70 - 99 MG/DL   POCT Glucose    Collection Time: 08/23/22  5:01 PM   Result Value Ref Range    POC Glucose 274 (H) 70 - 99 MG/DL        Imaging/Diagnostics Last 24 Hours   XR CHEST PORTABLE    Result Date: 8/20/2022  EXAMINATION: ONE XRAY VIEW OF THE CHEST 8/20/2022 6:27 pm COMPARISON: None. HISTORY: ORDERING SYSTEM PROVIDED HISTORY: COVID 19 +, TECHNOLOGIST PROVIDED HISTORY: Reason for exam:->COVID 23 +, Reason for Exam: covid 23 + FINDINGS: Overlying items external to the patient somewhat limit evaluation. Mild hazy airspace opacities bilaterally to upper lung zones. No pleural effusions or pneumothoraces. Cardiac and mediastinal silhouettes are within normal limits. No acute bony abnormalities. Mild hazy airspace opacities to bilateral upper lung zones, nonspecific but would be compatible with COVID-19 pneumonia given the clinical history.        Electronically signed by Suleiman Barfield MD on 8/23/2022 at 5:27 PM

## 2022-08-23 NOTE — PROGRESS NOTES
Occupational Therapy      Occupational Therapy Treatment Note    Name: Evonne Walden MRN: [id number] :   1940   Date:  2022   Admission Date: 2022 Room:  -A     Primary Problem: Lt hip intertrochanteric fracture s/p Trochanteric nailing, COVID-19    Restrictions/Precautions: General Precautions, Fall Risk, WBAT Lt LE, Droplet Plus Isolation, Telemetry, Pulse Ox, BP cuff, Bed/chair alarm     Communication with other providers: PT Carley Izaguirre, RN Joy Westfall    Subjective:  Patient states: \"I've been waiting for you this morning! \"   Pain: Pt denied pain this date at rest    Objective:    Observation: Pt received in supine upon OT arrival. Pleasant and agreeable to treatment. Objective Measures: Stable HR throughout session, A & O x 4    Treatment, including education:  Therapeutic Activity Training:   Therapeutic activity training was instructed today. Cues were given for safety, sequence, UE/LE placement, awareness, and balance. Self Care Training:   Cues were given for safety, sequence, UE/LE placement, visual cues, and balance. Pt received in supine upon OT arrival. Pt re-educated on role of OT, POC, WBAT status Lt LE, and importance of OOB activity. Pt transferred supine to sitting EOB mod A with HOB elevated to 40'. Pt with improved ability to scoot Lt LE to edge this date but still required mod trunk boost for uprighting. Pt able to sit at EOB level ranging from CGA to close SBA. Pt completed grooming task of brushing hair seated EOB SBA using Lt hand and continued intention tremors noted. Pt continued to be dependent for donning BL socks seated EOB. Pt completed sit to stand transfer from bed mod A with mod cues for safe hand placement. Pt tolerated ~30 seconds of static standing min A with RW. Pt returned to sitting EOB min A for controlled descent due to anxious behaviors.  Chair positioned adjacent to bed, and pt completed stand pivot transfer bed to chair mod A with cues for technique/safe hand placement. Seated at chair level, pt completed facial hygiene task SBA with setup and drank sips of water SBA with setup and continued intention tremors noted. Pt completed sit to stand transfer from chair mod A with mod cues for safe hand placement. Pt ambulated ~ 4 steps forward mod A x 2 with PT positioned in front of pt for support and OT positioned behind pt. Pt fearful with use of RW this date. Pt required max cues for weight-shifting, sequencing steps, and max encouragement/emotional support provided. Pt returned to chair mod A for controlled descent. Pt left positioned for comfort in chair with all lines intact, all needs within reach, and chair alarm on. Assessment / Impression:    Patient's tolerance of treatment: Improved  Adverse Reaction: None  Significant change in status and impact: Improved bed mobility and able to initiate stepping with RW this date  Barriers to improvement: Anxious behaviors, UE resting/intention tremors    Plan for Next Session:    Continue per OT POC. Continue to recommend inpatient rehab at discharge. Pt is NOT SAFE for immediate return home. Unfortunately pt could quickly become WHEELCHAIR-BOUND if she does not go to inpatient rehab or skilled rehab setting at discharge.     Time in: 1110  Time out: 1140  Timed treatment minutes: 30  Total treatment time: 30    Electronically signed by:    AMBREEN Lin/L, [place], [id number]

## 2022-08-23 NOTE — CARE COORDINATION
Received referral for possible admission to ARU. Patient has [employer]. Looking into if this insurance covers ARU. ARU will continue to follow.

## 2022-08-23 NOTE — CARE COORDINATION
Noted PT/OT recommendation of ARU. Patient has a travelers insurance that covers hospitalization and emergent needs only. Patient is also COVID +. Spoke with nursing. Nursing indicates that the patient's daughter has been doing amazing in the room with her mother. Plan at this time may need to be home with [facility]. Will follow up with pt/family to determine potential needs.

## 2022-08-23 NOTE — PROGRESS NOTES
Pulmonary and Critical Care  Progress Note    Subjective: The patient is comfortable in bed. On RA. Shortness of breath none  Chest pain none  Addressing respiratory complaints Patient is negative for  hemoptysis and cyanosis  CONSTITUTIONAL:  negative for fevers and chills      Past Medical History:     has no past medical history on file. has a past surgical history that includes hip surgery (Left, 8/21/2022). reports that she has never smoked. She has never used smokeless tobacco.  Family history:  family history is not on file. Allergies   Allergen Reactions    Aspirin Anaphylaxis     Social History:    Reviewed; no changes    Objective:   PHYSICAL EXAM:        VITALS:  BP (!) 108/46   Pulse 92   Temp 98.9 °F (37.2 °C) (Oral)   Resp 16   Ht 5' 2\" (1.575 m)   Wt 160 lb 15 oz (73 kg)   SpO2 96%   BMI 29.44 kg/m²     24HR INTAKE/OUTPUT:    Intake/Output Summary (Last 24 hours) at 8/23/2022 1053  Last data filed at 8/23/2022 0500  Gross per 24 hour   Intake 360 ml   Output 1700 ml   Net -1340 ml       CONSTITUTIONAL:  awake, alert, cooperative, no apparent distress, and appears stated age  LUNGS:  clear to auscultation   CARDIOVASCULAR:  normal S1 and S2 and negative JVD  ABD:Abdomen soft, non-tender. BS normal. No masses,  No organomegaly  NEURO:Alert. DATA:    CBC:  Recent Labs     08/21/22  0659 08/22/22  0703 08/23/22  0432   WBC 10.2 10.2 9.3   RBC 3.52* 2.89* 2.56*   HGB 9.7* 8.1* 7.2*   HCT 30.3* 25.3* 22.7*    201 185   MCV 86.1 87.5 88.7   MCH 27.6 28.0 28.1   MCHC 32.0 32.0 31.7*   RDW 14.4 14.6 14.6   SEGSPCT 73.5* 70.4* 51.7      BMP:  Recent Labs     08/21/22  0659 08/22/22  0703 08/23/22  0432   * 134* 137   K 4.1 4.4 4.3   CL 97* 101 104   CO2 26 25 27   BUN 17 18 22   CREATININE 0.6 0.7 0.7   CALCIUM 9.9 9.9 9.3   GLUCOSE 305* 272* 248*      ABG:  No results for input(s): PH, PO2ART, HIX6WCR, HCO3, BEART, O2SAT in the last 72 hours.   Lab Results   Component Value Date PROBNP 238.4 08/22/2022     No results found for: [facility]    Radiology Review:  Pertinent images / reports were reviewed as a part of this visit. Assessment:     Patient Active Problem List   Diagnosis    COVID-19    Closed displaced fracture of left femoral neck (HCC)    Femoral neck stress fracture, initial encounter       Plan:   1. Overall the patient has improved. 2. Inc. activity. 3. Repeat CXR.     Laury Ruiz MD   8/23/2022  10:53 AM

## 2022-08-24 LAB
BASOPHILS ABSOLUTE: 0.1 K/CU MM
BASOPHILS RELATIVE PERCENT: 0.6 % (ref 0–1)
DIFFERENTIAL TYPE: ABNORMAL
EOSINOPHILS ABSOLUTE: 0.3 K/CU MM
EOSINOPHILS RELATIVE PERCENT: 2.7 % (ref 0–3)
GLUCOSE BLD-MCNC: 170 MG/DL (ref 70–99)
GLUCOSE BLD-MCNC: 203 MG/DL (ref 70–99)
GLUCOSE BLD-MCNC: 325 MG/DL (ref 70–99)
GLUCOSE BLD-MCNC: 353 MG/DL (ref 70–99)
GLUCOSE BLD-MCNC: 354 MG/DL (ref 70–99)
HCT VFR BLD CALC: 22.7 % (ref 37–47)
HEMOGLOBIN: 7.1 GM/DL (ref 12.5–16)
IMMATURE NEUTROPHIL %: 0.9 % (ref 0–0.43)
IRON: 65 UG/DL (ref 37–145)
LYMPHOCYTES ABSOLUTE: 4.2 K/CU MM
LYMPHOCYTES RELATIVE PERCENT: 40.2 % (ref 24–44)
MCH RBC QN AUTO: 27.7 PG (ref 27–31)
MCHC RBC AUTO-ENTMCNC: 31.3 % (ref 32–36)
MCV RBC AUTO: 88.7 FL (ref 78–100)
MONOCYTES ABSOLUTE: 0.9 K/CU MM
MONOCYTES RELATIVE PERCENT: 8.9 % (ref 0–4)
NUCLEATED RBC %: 0.2 %
PCT TRANSFERRIN: 29 % (ref 10–44)
PDW BLD-RTO: 14.8 % (ref 11.7–14.9)
PLATELET # BLD: 222 K/CU MM (ref 140–440)
PMV BLD AUTO: 10 FL (ref 7.5–11.1)
RBC # BLD: 2.56 M/CU MM (ref 4.2–5.4)
SEGMENTED NEUTROPHILS ABSOLUTE COUNT: 4.9 K/CU MM
SEGMENTED NEUTROPHILS RELATIVE PERCENT: 46.7 % (ref 36–66)
TOTAL IMMATURE NEUTOROPHIL: 0.09 K/CU MM
TOTAL IRON BINDING CAPACITY: 224 UG/DL (ref 250–450)
TOTAL NUCLEATED RBC: 0 K/CU MM
UNSATURATED IRON BINDING CAPACITY: 159 UG/DL (ref 110–370)
WBC # BLD: 10.4 K/CU MM (ref 4–10.5)

## 2022-08-24 PROCEDURE — 6370000000 HC RX 637 (ALT 250 FOR IP): Performed by: STUDENT IN AN ORGANIZED HEALTH CARE EDUCATION/TRAINING PROGRAM

## 2022-08-24 PROCEDURE — 6370000000 HC RX 637 (ALT 250 FOR IP): Performed by: ORTHOPAEDIC SURGERY

## 2022-08-24 PROCEDURE — 97535 SELF CARE MNGMENT TRAINING: CPT

## 2022-08-24 PROCEDURE — 85025 COMPLETE CBC W/AUTO DIFF WBC: CPT

## 2022-08-24 PROCEDURE — 1200000000 HC SEMI PRIVATE

## 2022-08-24 PROCEDURE — 97116 GAIT TRAINING THERAPY: CPT

## 2022-08-24 PROCEDURE — 36415 COLL VENOUS BLD VENIPUNCTURE: CPT

## 2022-08-24 PROCEDURE — 94761 N-INVAS EAR/PLS OXIMETRY MLT: CPT

## 2022-08-24 PROCEDURE — 83550 IRON BINDING TEST: CPT

## 2022-08-24 PROCEDURE — 2060000000 HC ICU INTERMEDIATE R&B

## 2022-08-24 PROCEDURE — 83540 ASSAY OF IRON: CPT

## 2022-08-24 PROCEDURE — 2580000003 HC RX 258: Performed by: ORTHOPAEDIC SURGERY

## 2022-08-24 PROCEDURE — 97530 THERAPEUTIC ACTIVITIES: CPT

## 2022-08-24 PROCEDURE — 2580000003 HC RX 258: Performed by: ANESTHESIOLOGY

## 2022-08-24 PROCEDURE — 82962 GLUCOSE BLOOD TEST: CPT

## 2022-08-24 PROCEDURE — 6360000002 HC RX W HCPCS: Performed by: ORTHOPAEDIC SURGERY

## 2022-08-24 PROCEDURE — 97110 THERAPEUTIC EXERCISES: CPT

## 2022-08-24 RX ORDER — INSULIN GLARGINE 100 [IU]/ML
20 INJECTION, SOLUTION SUBCUTANEOUS NIGHTLY
Status: DISCONTINUED | OUTPATIENT
Start: 2022-08-24 | End: 2022-08-25

## 2022-08-24 RX ORDER — INSULIN GLARGINE 100 [IU]/ML
18 INJECTION, SOLUTION SUBCUTANEOUS NIGHTLY
Status: DISCONTINUED | OUTPATIENT
Start: 2022-08-24 | End: 2022-08-24

## 2022-08-24 RX ORDER — INSULIN LISPRO 100 [IU]/ML
10 INJECTION, SOLUTION INTRAVENOUS; SUBCUTANEOUS EVERY 8 HOURS
Status: DISCONTINUED | OUTPATIENT
Start: 2022-08-24 | End: 2022-08-25

## 2022-08-24 RX ADMIN — AMLODIPINE BESYLATE 5 MG: 5 TABLET ORAL at 09:43

## 2022-08-24 RX ADMIN — ENOXAPARIN SODIUM 30 MG: 100 INJECTION SUBCUTANEOUS at 09:43

## 2022-08-24 RX ADMIN — INSULIN LISPRO 7 UNITS: 100 INJECTION, SOLUTION INTRAVENOUS; SUBCUTANEOUS at 04:06

## 2022-08-24 RX ADMIN — OXYCODONE HYDROCHLORIDE 5 MG: 5 TABLET ORAL at 11:19

## 2022-08-24 RX ADMIN — BARICITINIB 4 MG: 2 TABLET, FILM COATED ORAL at 09:43

## 2022-08-24 RX ADMIN — INSULIN LISPRO 16 UNITS: 100 INJECTION, SOLUTION INTRAVENOUS; SUBCUTANEOUS at 09:44

## 2022-08-24 RX ADMIN — LISINOPRIL 5 MG: 5 TABLET ORAL at 09:44

## 2022-08-24 RX ADMIN — ACETAMINOPHEN 650 MG: 325 TABLET ORAL at 17:08

## 2022-08-24 RX ADMIN — METOPROLOL SUCCINATE 25 MG: 25 TABLET, EXTENDED RELEASE ORAL at 17:09

## 2022-08-24 RX ADMIN — ENOXAPARIN SODIUM 30 MG: 100 INJECTION SUBCUTANEOUS at 21:44

## 2022-08-24 RX ADMIN — ACETAMINOPHEN 650 MG: 325 TABLET ORAL at 21:44

## 2022-08-24 RX ADMIN — Medication 10 ML: at 21:49

## 2022-08-24 RX ADMIN — INSULIN GLARGINE 20 UNITS: 100 INJECTION, SOLUTION SUBCUTANEOUS at 21:45

## 2022-08-24 RX ADMIN — Medication 10 ML: at 09:44

## 2022-08-24 RX ADMIN — INSULIN LISPRO 10 UNITS: 100 INJECTION, SOLUTION INTRAVENOUS; SUBCUTANEOUS at 17:15

## 2022-08-24 RX ADMIN — SODIUM CHLORIDE, PRESERVATIVE FREE 10 ML: 5 INJECTION INTRAVENOUS at 09:44

## 2022-08-24 RX ADMIN — INSULIN LISPRO 12 UNITS: 100 INJECTION, SOLUTION INTRAVENOUS; SUBCUTANEOUS at 17:15

## 2022-08-24 RX ADMIN — ACETAMINOPHEN 650 MG: 325 TABLET ORAL at 09:47

## 2022-08-24 RX ADMIN — INSULIN LISPRO 7 UNITS: 100 INJECTION, SOLUTION INTRAVENOUS; SUBCUTANEOUS at 09:44

## 2022-08-24 RX ADMIN — INSULIN LISPRO 16 UNITS: 100 INJECTION, SOLUTION INTRAVENOUS; SUBCUTANEOUS at 12:11

## 2022-08-24 RX ADMIN — BISACODYL 5 MG: 5 TABLET, COATED ORAL at 09:43

## 2022-08-24 NOTE — DISCHARGE INSTRUCTIONS
-Finish course of Eliquis.     -Visit Dr. Alexandre Garcia in the office in 1 week to remove the sutures. Follow up: [phone]  If you go to a skilled nursing unit follow up in 4 weeks for evaluation of the wound & for X-rays. Call [phone] for an appointment. If you go Home follow up in 2 weeks for an suture removal and X-rays. Sutures out in 14 days after surgery      When You Were in the [address] [place] [address] were in the hospital for surgery to repair a hip fracture, a break in the upper part of your thigh bone. You may have had hip pinning surgery or a special metal plate and screws, called compression screws, put in place. You may have had a hemiarthroplasty to replace the ball part of your hip joint. You received physical therapy while you were in the hospital or at a rehabilitation center before being discharged from the hospital.      Most will need to go to a skilled nursing unit during your recovery. This will aid in your rehabilitation. You will be able to focus on your physical rehabilitation and not worry about daily needs and activities of daily living (meals, laundry, household chores)      What to Expect at [facility] Nursing unit  Most of the problems that develop after hip fracture surgery can be prevented by getting out of bed and walking as soon as possible. For this reason, it is very important to stay active and follow the instructions from physical therapy. You may have bruises around your incision. These will go away. It is normal for the skin around your incision to be a little red. It is also normal to have a small amount of watery or dark bloody fluid draining from your incision for several days. Wound Care  You may start showering again about 5 to 7 days after your surgery. Ask your doctor or nurse when you can start. After you shower, gently pat the incision area dry with a clean towel. Do not rub it dry.    Do not soak in a bathtub, swimming pool, or hot tub until your doctor says it is okay. Change your dressing (bandage) over your incision every day. Gently wash the wound with soap and water and pat it dry. Check your incision for any signs of infection at least once a day. These signs include more redness, more drainage, or the wound is opening up. If this occurs call Dr. Ericka Galvan [phone])    Your staples will be removed in 10-14 days. Medications  You will be given a list of medications to resume once discharged from the hospital.  Follow as directed. Pain medications; A narcotic pain medication will be given to you. Take for only pain. These narcotic pain medications can cause dry mouth,  dizziness, and confusion. They can also slow your bowels and you may not have a bowel movement for several days. Take over the counter Colace once a day while on these medications to prevent this. If you have not had a bowel movement for several days call your family doctor. Blood thinner  You will be on a blood thinner for 2-3 weeks as directed. This is important to prevent blood clots. Other Self-care  To prevent another fracture, do everything you can to make your bones strong. If you smoke, stop. Ask your doctor for help quitting. Smoking will keep your bone from healing. Tell your doctor if you drink alcohol regularly. You might have a bad reaction from taking pain medicine and drinking alcohol. Alcohol may also make it harder to recovery from surgery. Keep wearing the compression stockings you used in the hospital until you doctor says you can stop. This will be at least 2 or 3 weeks. If you have pain, take the pain medicines your doctor prescribed. Getting up and moving around can also help reduce your pain.    Be careful not to get pressure sores (also called pressure ulcers or bed sores) from staying in bed or a chair for long periods of time      When to Call the [phone]  Call Dr. Jacob if you have:   Shortness of breath or chest pain when you breathe   Frequent urination or burning when you urinate   Redness or increasing pain around your incision   Drainage from your incision   Swelling in one of your legs (it will be red and warmer than the other leg)   Fever higher than 101°F   Pain that is not controlled by your pain medicines   Nosebleeds or blood in your urine or stools, if you are taking blood thiners

## 2022-08-24 NOTE — PROGRESS NOTES
Physical Therapy    Physical Therapy Treatment Note  Name: Chan Reed MRN: [id number] :   1940   Date:  2022   Admission Date: 2022 Room:  -A   Restrictions/Precautions:          droplet plus, WBAT LLE   Communication with other providers:  RN, ELVI Ley present to provide necessary skilled services   Subjective:  Patient states:  she is agreeable for rehab, she questions why she is unable to move unaffected extremity with gait training  Pain:   Location, Type, Intensity (0/10 to 10/10):  denies pain upon arrival. Inc pain with WB and movement 4-10  Objective:    Observation:    Supine in bed. Cooperative and motivated to work with therapy but very anxious limiting some of her mobility. Max education with using RW with gait training. BUE tremors which increases with anxiety  Treatment, including education/measures:  Supine to sit: mod A x2 for trunk boost and LLE guidance. HOB elevated ~45 deg with use of bed rail. Step by step cues provided   Scooting: fwd to EOB modA for hip advancement. Cues for lateral weight shift. Max A x2 scooting back into recliner  Sit to stand: Mod A from EOB and Mod-Max A x1 (fatigue dependent) from recliner (3x). Cues for hand sequencing from seat surface to RW with fwd weight shift. Max cues for BUE effort into RW  Seated ROM: AROM LAQ ea side 10 reps x2 sets  Standing tolerance: Min A with BUE support at RW, cues for upright posture, gaze forward and BUE effort   Stand to sit: Mod A for controlling sit to recliner. Stand pivot: Mod A for fwd weight shift, lift, steadying, and facilitation of turn. Ambulation: 3-4 steps x3 sets, Mod A x2 with RW. Max cues for RW sequence, body weight shift off ea LE, and posture. Moderate seated rest break and visual and verbal education on gait sequence and RW usage. Educated pt on POC, role of PT, DME use ,discharge.  Cues provided for sequencing to inc safety and indep with mobility   Assessment / Impression: Patient's tolerance of treatment:  good   Adverse Reaction: no  Significant change in status and impact:  Improved tolerance to bed mobility and transfers. Barriers to improvement:  activity tolerance, strength, balance, pain, anxiety/fear, hx of PD   Plan for Next Session:    Activity tolerance, strength, balance, gait, transfers,bed mobility   Time in:  1115  Time out:  1153  Timed treatment minutes:  38  Total treatment time:  38 minutes     Previously filed items:  Social/Functional History  Lives With: Daughter  Type of Home: House  Home Layout: Two level, Bed/Bath upstairs  Home Access: Stairs to enter without rails  Entrance Stairs - Number of Steps: 3  Entrance Stairs - Rails: None  Bathroom Shower/Tub: Tub/Shower unit, Walk-in shower  Bathroom Toilet: Standard  Bathroom Accessibility: Accessible  Has the patient had two or more falls in the past year or any fall with injury in the past year?: No  ADL Assistance: [address] [place] [address] Avenue: Independent  Homemaking Responsibilities: Yes  Ambulation Assistance: Independent (uses no AD)  Transfer Assistance: Independent  Active : No  Occupation: Retired  Additional Comments: Pt is visiting her daughter for 6 months from [address]  Time Frame for Short term goals: 2 weeks  Short term goal 1: Pt will perform sit><supine modA  Short term goal 2: Pt will transfer sit><stand Darell  Short term goal 3: Pt will transfer between surfaces Darell  Short term goal 4: Pt will ambulate 30ft with RW Darell    Electronically signed by:     Ashwin Toribio PTA  8/24/2022, 12:21 PM

## 2022-08-24 NOTE — PROGRESS NOTES
V2.0  [facility] [place]      Name:  Char Lomas /Age/Sex: 1940  (81 y.o. female)   MRN & CSN:  [id number] & [id number] Encounter Date/Time: 2022 8:27 AM EDT    Location:  -A PCP: Benjamin Guardado MD       Hospital Day: 5    Assessment and Plan:   Char Lomas is a 81 y.o. female with pmh of HTN, DM II who presents with Closed displaced fracture of left femoral neck ([address]      Plan:  #Acute fracture of left femoral neck 2/2 Fall at home, initial encounter  X-ray left hip shows fracture of left surgical neck of the femur with extension into the greater trochanter and mild shortening  S/p ORIF 2022  - Orthopedic surgery was cleared pt for discharge;              - Pain control PRN              - Antiemetics PRN              - Case management consult, PT/OT      #COVID 19   Incidental finding, asymptomatic. No URI symptoms. CXR shows mild apical opacities typical for Covid-19 resolved on repeat CXR. -Albuterol as needed for wheezing              -pulm following pt; per their recs, pt was started on steroids and barcitinib; hold as now cXR is clear and pt has no symptoms       #Essential hypertension              Continue home Norvasc, lisinopril, Toprol-XL     #acute anemia - stable. Likely post-op holding around ~7. Hemodynamically stable but mildly tachycardic 100-105. Iron studies wnl. No sign of hematoma. - monitor  - pt prefers not to be transfused unless medically unstable     #Non-insulin-dependent type 2 diabetes mellitus  HA1C  7.7 2022. On home oral metformin, sitagliptin, gliclazid. Pt glucose normally controlled at home but presented with glucose 300's. Likely 2/2 stress and now steroids. Glucose is now better.   - continue HDSSI  - increase lantus to 15U  - continue 7U TID  - may decrease later in the day now pt is off steroids  - hypoglycemia protocol  - POCT glucose 4times daily       Diet ADULT ORAL NUTRITION SUPPLEMENT; Breakfast, Lunch, Dinner; IntraVENous 2 times per day    enoxaparin  30 mg SubCUTAneous BID    acetaminophen  650 mg Oral Q6H    sodium chloride flush  5-40 mL IntraVENous 2 times per day    amLODIPine  5 mg Oral Daily    lisinopril  5 mg Oral Daily    metoprolol succinate  25 mg Oral QPM    bisacodyl  5 mg Oral Daily      Infusions:    sodium chloride      sodium chloride      dextrose      sodium chloride 100 mL/hr at 08/22/22 0449     PRN Meds: sodium chloride flush, 5-40 mL, PRN  sodium chloride, , PRN  HYDROmorphone, 0.25 mg, Q3H PRN   Or  HYDROmorphone, 0.5 mg, Q3H PRN  oxyCODONE, 5 mg, Q4H PRN   Or  oxyCODONE, 10 mg, Q4H PRN  ondansetron, 4 mg, Q8H PRN   Or  ondansetron, 4 mg, Q6H PRN  sodium chloride flush, 5-40 mL, PRN  sodium chloride, 25 mL, PRN  glucose, 4 tablet, PRN  dextrose bolus, 125 mL, PRN   Or  dextrose bolus, 250 mL, PRN  glucagon (rDNA), 1 mg, PRN  dextrose, , Continuous PRN  sodium chloride flush, 5-40 mL, PRN  sodium chloride, , PRN  polyethylene glycol, 17 g, Daily PRN  guaiFENesin-dextromethorphan, 5 mL, Q4H PRN      Labs      Recent Results (from the past 24 hour(s))   POCT Glucose    Collection Time: 08/23/22 12:30 PM   Result Value Ref Range    POC Glucose 365 (H) 70 - 99 MG/DL   POCT Glucose    Collection Time: 08/23/22  5:01 PM   Result Value Ref Range    POC Glucose 274 (H) 70 - 99 MG/DL   Hemoglobin and Hematocrit    Collection Time: 08/23/22  5:07 PM   Result Value Ref Range    Hemoglobin 7.7 (L) 12.5 - 16.0 GM/DL    Hematocrit 24.4 (L) 37 - 47 %   POCT Glucose    Collection Time: 08/23/22  9:14 PM   Result Value Ref Range    POC Glucose 384 (H) 70 - 99 MG/DL   POCT Glucose    Collection Time: 08/24/22  3:37 AM   Result Value Ref Range    POC Glucose 170 (H) 70 - 99 MG/DL   CBC auto differential    Collection Time: 08/24/22  4:14 AM   Result Value Ref Range    WBC 10.4 4.0 - 10.5 K/CU MM    RBC 2.56 (L) 4.2 - 5.4 M/CU MM    Hemoglobin 7.1 (L) 12.5 - 16.0 GM/DL    Hematocrit 22.7 (L) 37 - 47 %    MCV 88.7 78 - 100 FL    MCH 27.7 27 - 31 PG    MCHC 31.3 (L) 32.0 - 36.0 %    RDW 14.8 11.7 - 14.9 %    Platelets 944 674 - 457 K/CU MM    MPV 10.0 7.5 - 11.1 FL    Differential Type AUTOMATED DIFFERENTIAL     Segs Relative 46.7 36 - 66 %    Lymphocytes % 40.2 24 - 44 %    Monocytes % 8.9 (H) 0 - 4 %    Eosinophils % 2.7 0 - 3 %    Basophils % 0.6 0 - 1 %    Segs Absolute 4.9 K/CU MM    Lymphocytes Absolute 4.2 K/CU MM    Monocytes Absolute 0.9 K/CU MM    Eosinophils Absolute 0.3 K/CU MM    Basophils Absolute 0.1 K/CU MM    Nucleated RBC % 0.2 %    Total Nucleated RBC 0.0 K/CU MM    Total Immature Neutrophil 0.09 K/CU MM    Immature Neutrophil % 0.9 (H) 0 - 0.43 %   Iron and TIBC    Collection Time: 08/24/22  4:14 AM   Result Value Ref Range    Iron 65 37 - 145 ug/dL    UIBC 159 110 - 370 ug/dL    TIBC 224 (L) 250 - 450 ug/dL    Transferrin % 29 10 - 44 %        Imaging/Diagnostics Last 24 Hours   XR CHEST PORTABLE    Result Date: 8/20/2022  EXAMINATION: ONE XRAY VIEW OF THE CHEST 8/20/2022 6:27 pm COMPARISON: None. HISTORY: ORDERING SYSTEM PROVIDED HISTORY: COVID 19 +, TECHNOLOGIST PROVIDED HISTORY: Reason for exam:->COVID 23 +, Reason for Exam: covid 23 + FINDINGS: Overlying items external to the patient somewhat limit evaluation. Mild hazy airspace opacities bilaterally to upper lung zones. No pleural effusions or pneumothoraces. Cardiac and mediastinal silhouettes are within normal limits. No acute bony abnormalities. Mild hazy airspace opacities to bilateral upper lung zones, nonspecific but would be compatible with COVID-19 pneumonia given the clinical history.        Electronically signed by Gary Holly MD on 8/24/2022 at 9:38 AM

## 2022-08-24 NOTE — CARE COORDINATION
CM called [facility] to see if they accept the pt's insurance.  stated that all the business staff was in a meeting. CM sent message to [employer] with [facility] to see if they accept [employer].

## 2022-08-24 NOTE — PROGRESS NOTES
Pulmonary and Critical Care  Progress Note    Subjective: The patient has improved. On RA. CXR:Improved. Shortness of breath none  Chest pain none  Addressing respiratory complaints Patient is negative for  hemoptysis and cyanosis  CONSTITUTIONAL:  negative for fevers and chills      Past Medical History:     has no past medical history on file. has a past surgical history that includes hip surgery (Left, 8/21/2022). reports that she has never smoked. She has never used smokeless tobacco.  Family history:  family history is not on file. Allergies   Allergen Reactions    Aspirin Anaphylaxis     Social History:    Reviewed; no changes    Objective:   PHYSICAL EXAM:        VITALS:  BP (!) 127/50   Pulse 84   Temp 97.5 °F (36.4 °C) (Oral)   Resp 10   Ht 5' 2\" (1.575 m)   Wt 160 lb 15 oz (73 kg)   SpO2 100%   BMI 29.44 kg/m²     24HR INTAKE/OUTPUT:    Intake/Output Summary (Last 24 hours) at 8/24/2022 1049  Last data filed at 8/24/2022 3520  Gross per 24 hour   Intake --   Output 1200 ml   Net -1200 ml       CONSTITUTIONAL:  awake, alert, cooperative, no apparent distress, and appears stated age  LUNGS:  clear to auscultation   CARDIOVASCULAR:  normal S1 and S2 and negative JVD  ABD:Abdomen soft, non-tender. BS normal. No masses,  No organomegaly  NEURO:Alert. DATA:    CBC:  Recent Labs     08/22/22  0703 08/23/22  0432 08/23/22  1707 08/24/22  0414   WBC 10.2 9.3  --  10.4   RBC 2.89* 2.56*  --  2.56*   HGB 8.1* 7.2* 7.7* 7.1*   HCT 25.3* 22.7* 24.4* 22.7*    185  --  222   MCV 87.5 88.7  --  88.7   MCH 28.0 28.1  --  27.7   MCHC 32.0 31.7*  --  31.3*   RDW 14.6 14.6  --  14.8   SEGSPCT 70.4* 51.7  --  46.7      BMP:  Recent Labs     08/22/22  0703 08/23/22  0432   * 137   K 4.4 4.3    104   CO2 25 27   BUN 18 22   CREATININE 0.7 0.7   CALCIUM 9.9 9.3   GLUCOSE 272* 248*      ABG:  No results for input(s): PH, PO2ART, CLX5JTZ, HCO3, BEART, O2SAT in the last 72 hours.   Lab Results Component Value Date    PROBNP 238.4 08/22/2022     No results found for: [facility]    Radiology Review:  Pertinent images / reports were reviewed as a part of this visit. Assessment:     Patient Active Problem List   Diagnosis    COVID-19    Closed displaced fracture of left femoral neck (HCC)    Femoral neck stress fracture, initial encounter       Plan:   1. Overall the patient has improved. 2. Inc. activity.     Honor Ahumada, MD   8/24/2022  10:49 AM

## 2022-08-24 NOTE — PROGRESS NOTES
verbal/tactile cues for trunk extension/upright posture upon standing. Pt able to ambulate ~3 ft mod A x 2 with RW. Pt required max verbal/tactile cues for weight-shifting, sequencing steps, and RW mgmt with stepping. Pt fatigued, and transferred stand to sit to chair mod A for controlled descent. Pt took additional rest, able to stand x2 additional trials mod A, and ambulate an additional ~3 ft mod A x 2 with RW (x2 trials). Pt returned to chair mod A. Pt able to drink sips of water at chair level with supervision/setup. Pt left positioned for comfort in chair with all lines intact, all needs within reach, and chair alarm on. Assessment / Impression:    Patient's tolerance of treatment: Well  Adverse Reaction: None  Significant change in status and impact: Decreased tremors and anxious behaviors this date, Tolerated increased activity  Barriers to improvement: Anxious behaviors      Plan for Next Session:    Continue per OT POC. Continue to recommend inpatient rehab at discharge.     Time in: 1115  Time out: 1154  Timed treatment minutes: 39  Total treatment time: 39    Electronically signed by:    AMBREEN Galicia/L, [email or web address], [id number]

## 2022-08-25 LAB
BASOPHILS ABSOLUTE: 0.1 K/CU MM
BASOPHILS RELATIVE PERCENT: 0.6 % (ref 0–1)
DIFFERENTIAL TYPE: ABNORMAL
EOSINOPHILS ABSOLUTE: 0.4 K/CU MM
EOSINOPHILS RELATIVE PERCENT: 3.2 % (ref 0–3)
GLUCOSE BLD-MCNC: 181 MG/DL (ref 70–99)
GLUCOSE BLD-MCNC: 199 MG/DL (ref 70–99)
GLUCOSE BLD-MCNC: 222 MG/DL (ref 70–99)
GLUCOSE BLD-MCNC: 288 MG/DL (ref 70–99)
GLUCOSE BLD-MCNC: 305 MG/DL (ref 70–99)
HCT VFR BLD CALC: 23.7 % (ref 37–47)
HEMOGLOBIN: 7.5 GM/DL (ref 12.5–16)
IMMATURE NEUTROPHIL %: 1.1 % (ref 0–0.43)
LYMPHOCYTES ABSOLUTE: 2.9 K/CU MM
LYMPHOCYTES RELATIVE PERCENT: 22.5 % (ref 24–44)
MCH RBC QN AUTO: 28.2 PG (ref 27–31)
MCHC RBC AUTO-ENTMCNC: 31.6 % (ref 32–36)
MCV RBC AUTO: 89.1 FL (ref 78–100)
MONOCYTES ABSOLUTE: 0.7 K/CU MM
MONOCYTES RELATIVE PERCENT: 5.4 % (ref 0–4)
NUCLEATED RBC %: 0.2 %
PDW BLD-RTO: 14.9 % (ref 11.7–14.9)
PLATELET # BLD: 256 K/CU MM (ref 140–440)
PMV BLD AUTO: 10 FL (ref 7.5–11.1)
RBC # BLD: 2.66 M/CU MM (ref 4.2–5.4)
SEGMENTED NEUTROPHILS ABSOLUTE COUNT: 8.6 K/CU MM
SEGMENTED NEUTROPHILS RELATIVE PERCENT: 67.2 % (ref 36–66)
TOTAL IMMATURE NEUTOROPHIL: 0.14 K/CU MM
TOTAL NUCLEATED RBC: 0 K/CU MM
WBC # BLD: 12.8 K/CU MM (ref 4–10.5)

## 2022-08-25 PROCEDURE — 2060000000 HC ICU INTERMEDIATE R&B

## 2022-08-25 PROCEDURE — 82962 GLUCOSE BLOOD TEST: CPT

## 2022-08-25 PROCEDURE — 94761 N-INVAS EAR/PLS OXIMETRY MLT: CPT

## 2022-08-25 PROCEDURE — 97110 THERAPEUTIC EXERCISES: CPT

## 2022-08-25 PROCEDURE — 2580000003 HC RX 258: Performed by: ORTHOPAEDIC SURGERY

## 2022-08-25 PROCEDURE — 97530 THERAPEUTIC ACTIVITIES: CPT

## 2022-08-25 PROCEDURE — 85025 COMPLETE CBC W/AUTO DIFF WBC: CPT

## 2022-08-25 PROCEDURE — 6360000002 HC RX W HCPCS: Performed by: ORTHOPAEDIC SURGERY

## 2022-08-25 PROCEDURE — 97116 GAIT TRAINING THERAPY: CPT

## 2022-08-25 PROCEDURE — 97535 SELF CARE MNGMENT TRAINING: CPT

## 2022-08-25 PROCEDURE — 2580000003 HC RX 258: Performed by: ANESTHESIOLOGY

## 2022-08-25 PROCEDURE — 6370000000 HC RX 637 (ALT 250 FOR IP): Performed by: ORTHOPAEDIC SURGERY

## 2022-08-25 PROCEDURE — 6370000000 HC RX 637 (ALT 250 FOR IP): Performed by: STUDENT IN AN ORGANIZED HEALTH CARE EDUCATION/TRAINING PROGRAM

## 2022-08-25 PROCEDURE — 36415 COLL VENOUS BLD VENIPUNCTURE: CPT

## 2022-08-25 PROCEDURE — 1200000000 HC SEMI PRIVATE

## 2022-08-25 RX ORDER — INSULIN GLARGINE 100 [IU]/ML
25 INJECTION, SOLUTION SUBCUTANEOUS NIGHTLY
Status: DISCONTINUED | OUTPATIENT
Start: 2022-08-25 | End: 2022-08-29

## 2022-08-25 RX ORDER — INSULIN LISPRO 100 [IU]/ML
8 INJECTION, SOLUTION INTRAVENOUS; SUBCUTANEOUS
Status: DISCONTINUED | OUTPATIENT
Start: 2022-08-25 | End: 2022-08-31 | Stop reason: HOSPADM

## 2022-08-25 RX ORDER — GLIPIZIDE 10 MG/1
10 TABLET ORAL
COMMUNITY

## 2022-08-25 RX ORDER — INSULIN LISPRO 100 [IU]/ML
10 INJECTION, SOLUTION INTRAVENOUS; SUBCUTANEOUS EVERY 8 HOURS
Status: DISCONTINUED | OUTPATIENT
Start: 2022-08-25 | End: 2022-08-25

## 2022-08-25 RX ADMIN — INSULIN LISPRO 12 UNITS: 100 INJECTION, SOLUTION INTRAVENOUS; SUBCUTANEOUS at 09:08

## 2022-08-25 RX ADMIN — ENOXAPARIN SODIUM 30 MG: 100 INJECTION SUBCUTANEOUS at 21:54

## 2022-08-25 RX ADMIN — ACETAMINOPHEN 650 MG: 325 TABLET ORAL at 10:21

## 2022-08-25 RX ADMIN — ACETAMINOPHEN 650 MG: 325 TABLET ORAL at 16:59

## 2022-08-25 RX ADMIN — BISACODYL 5 MG: 5 TABLET, COATED ORAL at 08:32

## 2022-08-25 RX ADMIN — Medication 10 ML: at 08:33

## 2022-08-25 RX ADMIN — INSULIN LISPRO 8 UNITS: 100 INJECTION, SOLUTION INTRAVENOUS; SUBCUTANEOUS at 09:08

## 2022-08-25 RX ADMIN — AMLODIPINE BESYLATE 5 MG: 5 TABLET ORAL at 08:32

## 2022-08-25 RX ADMIN — INSULIN LISPRO 8 UNITS: 100 INJECTION, SOLUTION INTRAVENOUS; SUBCUTANEOUS at 16:57

## 2022-08-25 RX ADMIN — INSULIN LISPRO 4 UNITS: 100 INJECTION, SOLUTION INTRAVENOUS; SUBCUTANEOUS at 12:26

## 2022-08-25 RX ADMIN — METOPROLOL SUCCINATE 25 MG: 25 TABLET, EXTENDED RELEASE ORAL at 16:59

## 2022-08-25 RX ADMIN — SODIUM CHLORIDE, PRESERVATIVE FREE 10 ML: 5 INJECTION INTRAVENOUS at 08:33

## 2022-08-25 RX ADMIN — INSULIN LISPRO 8 UNITS: 100 INJECTION, SOLUTION INTRAVENOUS; SUBCUTANEOUS at 21:53

## 2022-08-25 RX ADMIN — ENOXAPARIN SODIUM 30 MG: 100 INJECTION SUBCUTANEOUS at 08:32

## 2022-08-25 RX ADMIN — LISINOPRIL 5 MG: 5 TABLET ORAL at 08:32

## 2022-08-25 RX ADMIN — OXYCODONE HYDROCHLORIDE 5 MG: 5 TABLET ORAL at 10:22

## 2022-08-25 RX ADMIN — INSULIN GLARGINE 25 UNITS: 100 INJECTION, SOLUTION SUBCUTANEOUS at 21:54

## 2022-08-25 RX ADMIN — SODIUM CHLORIDE, PRESERVATIVE FREE 10 ML: 5 INJECTION INTRAVENOUS at 22:02

## 2022-08-25 RX ADMIN — Medication 10 ML: at 21:53

## 2022-08-25 ASSESSMENT — PAIN SCALES - GENERAL
PAINLEVEL_OUTOF10: 0
PAINLEVEL_OUTOF10: 0

## 2022-08-25 NOTE — PROGRESS NOTES
V2.0  [facility] Hospitalist Progress Note      Name:  Teresa Walton /Age/Sex: 1940  (81 y.o. female)   MRN & CSN:  [id number] & [id number] Encounter Date/Time: 2022 8:27 AM EDT    Location:  -A PCP: Sil Hoover MD       Hospital Day: 6    Assessment and Plan:   Teresa Walton is a 81 y.o. female with pmh of HTN, DM II who presents with Closed displaced fracture of left femoral neck ([address]      Plan:  #Acute fracture of left femoral neck 2/2 Fall at home, initial encounter  X-ray left hip shows fracture of left surgical neck of the femur with extension into the greater trochanter and mild shortening  S/p ORIF 2022  - Orthopedic surgery cleared pt for discharge;              - Pain control PRN; not requiring              - Case management consult, PT/OT    #COVID 19   Incidental finding, asymptomatic. No URI symptoms. CXR shows mild apical opacities typical for Covid-19 resolved on repeat CXR. -Albuterol as needed for wheezing              -pulm following pt; per their recs, she was started on steroids and barcitinib; d/c now cXR is clear and pt has no symptoms       #Essential hypertension  BP stable. Continue home Norvasc, lisinopril, Toprol-XL     #acute anemia - stable. Likely post-op holding around ~7. Hemodynamically stable but mildly tachycardic 100-105. Iron studies wnl. No sign of hematoma. - monitor  - pt prefers not to be transfused unless medically unstable     #Non-insulin-dependent type 2 diabetes mellitus  HA1C  7.7 2022. On home oral metformin, sitagliptin, gliclazid. Pt glucose normally controlled at home but presented with glucose 300's. Likely 2/2 stress and steroids. Glucose is slightly better. Pt is taking supplements which are high in carbs; pt stopped taking as of last night.   - continue HDSSI  - increase lantus to 25U (from 20U)  - will change additional insulin to 4x 7U with meals  - may decrease later in the day now pt is off steroids  - hypoglycemia protocol  - POCT glucose 4times daily       Diet ADULT ORAL NUTRITION SUPPLEMENT; Breakfast, Lunch, Dinner; Standard High Calorie/High Protein Oral Supplement  ADULT DIET; Regular; 3 carb choices (45 gm/meal); no pork products, no gelatin   DVT Prophylaxis [x] Lovenox, []  Heparin, [] SCDs, [] Ambulation,  [] Eliquis, [] Xarelto  [] Coumadin   Code Status Full Code   Disposition From: home  Expected Disposition: TBD  Estimated Date of Discharge: TBD  Patient requires continued admission due to  placement; medically stable   Surrogate Decision Maker/ POA      Subjective:     Chief Complaint: fall    Patient seen and examined at bedside. Last night, she got the lantus but she didn't get the scheduled lispro due to RN not feeling comfortable as pt was not eating. Today glucose was in the 300's. She denies any pain. She just feels weak and was asking when can be discharged. Denies lightheadedness, dizziness, fever, night sweats, chills, chest pain, SOB, cough, palpitations, abd pain, nausea, vomiting, diarrhea, dysuria. Review of Systems:    Review of Systems    See above      Objective: Intake/Output Summary (Last 24 hours) at 8/25/2022 0909  Last data filed at 8/25/2022 9583  Gross per 24 hour   Intake --   Output 2100 ml   Net -2100 ml        Vitals:   Vitals:    08/25/22 0400   BP: (!) 126/50   Pulse: 81   Resp: 12   Temp: 98.4 °F (36.9 °C)   SpO2:        Physical Exam:     General: NAD  Eyes: EOMI  ENT: neck supple  Cardiovascular: Regular rate. Respiratory: Clear to auscultation  Gastrointestinal: Soft, non tender  Genitourinary: no suprapubic tenderness  Musculoskeletal:  Neurovascularly intact to gross sensation and touch in lower extremities, able to move legs, dressing in place, clean and dry . Skin: warm, dry  Neuro: Alert. Psych: Mood appropriate.      Medications:   Medications:    insulin glargine  25 Units SubCUTAneous Nightly    insulin lispro  8 Units SubCUTAneous 4x Daily AC & HS    insulin lispro  0-4 Units SubCUTAneous Nightly    insulin lispro  0-16 Units SubCUTAneous TID WC    sodium chloride flush  5-40 mL IntraVENous 2 times per day    enoxaparin  30 mg SubCUTAneous BID    acetaminophen  650 mg Oral Q6H    sodium chloride flush  5-40 mL IntraVENous 2 times per day    amLODIPine  5 mg Oral Daily    lisinopril  5 mg Oral Daily    metoprolol succinate  25 mg Oral QPM    bisacodyl  5 mg Oral Daily      Infusions:    sodium chloride      sodium chloride      dextrose      sodium chloride 100 mL/hr at 08/22/22 0449     PRN Meds: sodium chloride flush, 5-40 mL, PRN  sodium chloride, , PRN  oxyCODONE, 5 mg, Q4H PRN   Or  oxyCODONE, 10 mg, Q4H PRN  ondansetron, 4 mg, Q8H PRN   Or  ondansetron, 4 mg, Q6H PRN  sodium chloride flush, 5-40 mL, PRN  sodium chloride, 25 mL, PRN  glucose, 4 tablet, PRN  dextrose bolus, 125 mL, PRN   Or  dextrose bolus, 250 mL, PRN  glucagon (rDNA), 1 mg, PRN  dextrose, , Continuous PRN  sodium chloride flush, 5-40 mL, PRN  sodium chloride, , PRN  polyethylene glycol, 17 g, Daily PRN  guaiFENesin-dextromethorphan, 5 mL, Q4H PRN      Labs      Recent Results (from the past 24 hour(s))   POCT Glucose    Collection Time: 08/24/22  9:46 AM   Result Value Ref Range    POC Glucose 354 (H) 70 - 99 MG/DL   POCT Glucose    Collection Time: 08/24/22 11:50 AM   Result Value Ref Range    POC Glucose 353 (H) 70 - 99 MG/DL   POCT Glucose    Collection Time: 08/24/22  5:05 PM   Result Value Ref Range    POC Glucose 325 (H) 70 - 99 MG/DL   POCT Glucose    Collection Time: 08/24/22  9:14 PM   Result Value Ref Range    POC Glucose 203 (H) 70 - 99 MG/DL   POCT Glucose    Collection Time: 08/25/22  2:02 AM   Result Value Ref Range    POC Glucose 181 (H) 70 - 99 MG/DL   POCT Glucose    Collection Time: 08/25/22  7:23 AM   Result Value Ref Range    POC Glucose 305 (H) 70 - 99 MG/DL        Imaging/Diagnostics Last 24 Hours   XR CHEST PORTABLE    Result Date: 8/20/2022  EXAMINATION: ONE XRAY VIEW OF THE CHEST 8/20/2022 6:27 pm COMPARISON: None. HISTORY: ORDERING SYSTEM PROVIDED HISTORY: COVID 19 +, TECHNOLOGIST PROVIDED HISTORY: Reason for exam:->COVID 23 +, Reason for Exam: covid 23 + FINDINGS: Overlying items external to the patient somewhat limit evaluation. Mild hazy airspace opacities bilaterally to upper lung zones. No pleural effusions or pneumothoraces. Cardiac and mediastinal silhouettes are within normal limits. No acute bony abnormalities. Mild hazy airspace opacities to bilateral upper lung zones, nonspecific but would be compatible with COVID-19 pneumonia given the clinical history.        Electronically signed by Rodrigue Bennett MD on 8/25/2022 at 9:09 AM

## 2022-08-25 NOTE — PROGRESS NOTES
Occupational Therapy      Occupational Therapy Treatment Note    Name: Kristian Villegas MRN: [id number] :   1940   Date:  2022   Admission Date: 2022 Room:  -A     Primary Problem: Lt hip intertrochanteric fracture s/p Trochanteric nailing, COVID-19    Restrictions/Precautions: General Precautions, Fall Risk, WBAT Lt LE, Droplet Plus Isolation, Telemetry, Pulse Ox, BP cuff, Bed/chair alarm    Communication with other providers: PT Sivan Snow, NA Gonzalez    Subjective:  Patient states: \"Are you happy with my progress today? \" (Pt stated at end of session)  Pain: Pt denied pain this date at rest    Objective:    Observation: Pt received in supine upon OT arrival. Pleasant and agreeable to treatment. Daughter present and supportive. Objective Measures: Stable HR throughout session. BP taken which was 88/61 seated in chair at end of session, 2nd reading of 117/79 seated in chair (uncertain of accuracy of 1st reading)    Treatment, including education:  Therapeutic Activity Training:   Therapeutic activity training was instructed today. Cues were given for safety, sequence, UE/LE placement, awareness, and balance. Self Care Training:   Cues were given for safety, sequence, UE/LE placement, visual cues, and balance. Therapeutic Exercise:  Cues were given for technique, safety, recruitment, and rationale. Cues were verbal and/or tactile. Pt received in supine upon OT arrival. Pt re-educated on role of OT, POC, WBAT status Lt LE, and importance of EOB/OOB activity. Pt transferred supine to sitting EOB mod A with HOB elevated to 45'. Assist required for scooting Lt LE fully to edge and continued mod trunk boost for uprighting. Pt able to sit at EOB level SBA with good static sitting balance this date/good midline orientation. Pt continued to be dependent for donning BL socks seated EOB.  Chair positioned adjacent to bed, and pt completed stand pivot transfer bed to chair mod A with mod cues for safe hand placement. Seated at chair level, pt completed grooming task of brushing hair and facial hygiene task with supervision using Lt hand. Pt completed 5 sit to stand transfers from chair with mod A on 4 trials and min A on final trial. Following each stand, pt was able to ambulate 2 ft min A x 2 with RW (x 5 trials). Pt required max coaching for weight-shifting, sequencing steps, and RW mgmt with each bout. Pt required 2nd person for stabilization of Rt hand and Rt elbow due to intention tremors 2/2 Parkinson's Disease and anxious behaviors. Following ambulating 10 ft (2 ft x 5 trials), pt transferred stand to sit to chair mod A for controlled descent. Pt educated on and trialed 10 reps of LE exercises including ankle pumps, knee extensions, and hip marches seated at chair level. Pt positioned for comfort with pillows in chair with all lines intact, all needs within reach, and chair alarm on. Assessment / Impression:    Patient's tolerance of treatment: Improved  Adverse Reaction: None  Significant change in status and impact: Continuing to make improvements with each session  Barriers to improvement: Anxious behaviors    Plan for Next Session:    Continue per OT POC. Continue to recommend inpatient rehab at discharge.      Time in: 1115  Time out: 1153  Timed treatment minutes: 38  Total treatment time: 38    Electronically signed by:    AMBREEN Lucero/L, [address] [place] [address], .[phone]

## 2022-08-25 NOTE — CARE COORDINATION
Char Lomas requires a bedside commode due to being confined to one level of the home, and is physically incapable of utilizing regular toilet facilities. Current body weight is Weight: 160 lb 15 oz (73 kg). Char Lomas was evaluated today and a DME order was entered for variable height hospital bed because she requires assistance for positioning needs not possible in an ordinary bed, complexity of body positioning needs. Patient needs variability of bed height to perform patient transfers and for toileting, ambulating, dressing upper body, and dressing lower body. Current body Weight: 160 lb 15 oz (73 kg). The need for this equipment was discussed with the patient and she understands and is in agreement.

## 2022-08-25 NOTE — PROGRESS NOTES
Pulmonary and Critical Care  Progress Note    Subjective: The patient is comfortable in bed. Doing better. On RA. Shortness of breath none  Chest pain none  Addressing respiratory complaints Patient is negative for  hemoptysis and cyanosis  CONSTITUTIONAL:  negative for fevers and chills      Past Medical History:     has no past medical history on file. has a past surgical history that includes hip surgery (Left, 8/21/2022). reports that she has never smoked. She has never used smokeless tobacco.  Family history:  family history is not on file. Allergies   Allergen Reactions    Aspirin Anaphylaxis     Social History:    Reviewed; no changes    Objective:   PHYSICAL EXAM:        VITALS:  BP (!) 126/50   Pulse 81   Temp 98.4 °F (36.9 °C) (Oral)   Resp 12   Ht 5' 2\" (1.575 m)   Wt 160 lb 15 oz (73 kg)   SpO2 99%   BMI 29.44 kg/m²     24HR INTAKE/OUTPUT:    Intake/Output Summary (Last 24 hours) at 8/25/2022 1021  Last data filed at 8/25/2022 7717  Gross per 24 hour   Intake --   Output 2100 ml   Net -2100 ml       CONSTITUTIONAL:  awake, alert, cooperative, no apparent distress, and appears stated age  LUNGS:  clear to auscultation   CARDIOVASCULAR:  normal S1 and S2 and negative JVD  ABD:Abdomen soft, non-tender. BS normal. No masses,  No organomegaly  NEURO:Alert. DATA:    CBC:  Recent Labs     08/23/22  0432 08/23/22  1707 08/24/22  0414   WBC 9.3  --  10.4   RBC 2.56*  --  2.56*   HGB 7.2* 7.7* 7.1*   HCT 22.7* 24.4* 22.7*     --  222   MCV 88.7  --  88.7   MCH 28.1  --  27.7   MCHC 31.7*  --  31.3*   RDW 14.6  --  14.8   SEGSPCT 51.7  --  46.7      BMP:  Recent Labs     08/23/22  0432      K 4.3      CO2 27   BUN 22   CREATININE 0.7   CALCIUM 9.3   GLUCOSE 248*      ABG:  No results for input(s): PH, PO2ART, [id number], HCO3, BEART, O2SAT in the last 72 hours.   Lab Results   Component Value Date    PROBNP 238.4 08/22/2022     No results found for: [facility]    Radiology Review: Pertinent images / reports were reviewed as a part of this visit. Assessment:     Patient Active Problem List   Diagnosis    COVID-19    Closed displaced fracture of left femoral neck (HCC)    Femoral neck stress fracture, initial encounter       Plan:   1. Overall the patient has improved. 2. Inc. activity.     Alistair Zavala MD   8/25/2022  10:21 AM

## 2022-08-25 NOTE — PROGRESS NOTES
Physical Therapy    Physical Therapy Treatment Note  Name: Ana Laura Desouza MRN: [id number] :   1940   Date:  2022   Admission Date: 2022 Room:  -A   Restrictions/Precautions:          droplet plus, WBAT LLE   Communication with other providers:  RN, OT   Subjective:  Patient states: \"Are you satisfied with what I did today? \"   Pain:   Location, Type, Intensity (0/10 to 10/10):  denies pain upon arrival, inc pain with WB activity but did not rate   Objective:    Observation:    Supine in bed. Daughter visiting and supportive. Cooperative with therapy. Continues to be very anxious with mobility, especially ambulating. Pt required a significant amount of time to complete mobility to adequate time for rest breaks. Cool wash cloth to forehead often with pt feeling overwhelmed and slightly lightheaded. /79 (89). Treatment, including education/measures:  Supine to sit: modA for trunk boost and LLE guidance. Slow to complete. HOB elevated ~45 deg with use of bed rail. Step by step cues provided. Needed assistance with LLE against gravity but pt able to advance laterally to EOB  Scooting: fwd to EOB Darell for hip advancement. Cues for lateral weight shift    Sit to stand: modA from recliner 4x and Darell on 1 trial  with inc power and sequencing on final trial. Cues for hand sequencing from seat surface to RW with fwd weight shift. Needed lift assist as well as steadying during hand transition    Stand to sit: modA for controlling sit to recliner. Cued numerous times for sequencing UEs back to chair with dec carry over due to impulsivity to sit due to pain and anxiety. Was able to reach back on final trial with single UE . Stand pivot: modA for fwd weight shift, lift, steadying, and facilitation of turn.    Ambulation: 2ft x ~5 trials with RW Darell x 2 for lateral weight shift with inc support on back (pt comfort), steadying assist, and hand over hand support on right UE to keep  and stability on walker (significant tremors due PD). Very slow pace with hesitation. Educated and cued pt for \"stepping\" with right LE vs \"sliding\". Very short step length bilat. Numerous seated breaks due to fear and pain. Sitting balance: SBA at EOB static with BUE support. Standing balance: x ~10-20 seconds static stance prior to initiating mobility. Martha at [employer] with slight posterior lean. Good upright trunk without additional cues needed. Educated pt on POC, role of PT, DME use ,progression with mobility, discharge.  Cues provided for sequencing to inc safety and indep with mobility     Assessment / Impression:    Patient's tolerance of treatment:  good    Adverse Reaction: no  Significant change in status and impact:  no  Barriers to improvement:  activity tolerance, strength, balance, fear/anxiety, pain   Plan for Next Session:    Activity tolerance, strength, balance, gait, transfers, bed mobility   Time in:  1115  Time out:  1153  Timed treatment minutes:  38  Total treatment time:  38 minutes    Previously filed items:  Social/Functional History  Lives With: Daughter  Type of Home: House  Home Layout: Two level, Bed/Bath upstairs  Home Access: Stairs to enter without rails  Entrance Stairs - Number of Steps: 3  Entrance Stairs - Rails: None  Bathroom Shower/Tub: Tub/Shower unit, Walk-in shower  Bathroom Toilet: Standard  Bathroom Accessibility: Accessible  Has the patient had two or more falls in the past year or any fall with injury in the past year?: No  ADL Assistance: [address] [place] [address] Avenue: Independent  Homemaking Responsibilities: Yes  Ambulation Assistance: Independent (uses no AD)  Transfer Assistance: Independent  Active : No  Occupation: Retired  Additional Comments: Pt is visiting her daughter for 6 months from [address]  Time Frame for Short term goals: 2 weeks  Short term goal 1: Pt will perform sit><supine modA  Short term goal 2: Pt will transfer sit><stand Darell  Short term goal 3: Pt will transfer between surfaces Darell  Short term goal 4: Pt will ambulate 30ft with RW Darell    Electronically signed by:    PEPE [id number]  8/25/2022, 2:42 PM

## 2022-08-25 NOTE — CARE COORDINATION
Received a call from [address] at [facility] regarding pt's needs. Pt has [employer] and we have never helped pt out before. Kennedy Velázquez advised pt would need a walker. I explained that typically [employer] does not cover DME for non citzen but pt could contact [address] [place] [address] Drive to find out about their hardship program. However, I could ask my manager and call her back. I spoke with Iván Barnett and she did approve a walker this time. Voucher created for a walker and faxed to [address] [place] [address].  Pt will go on the flag list. If she would need any further assistance, she must fill out an application and provide required documentation to be considered for eligibility. [employer]

## 2022-08-25 NOTE — CARE COORDINATION
Doctor please copy and paste to your order. Shlomo Horton was evaluated today and a DME order was entered for a wheeled walker because she requires this to successfully complete daily living tasks of eating, bathing, toileting, personal cares, ambulating, grooming, and hygiene. A wheeled walker is necessary due to the patient's unsteady gait, upper body weakness, and inability to  an ambulation device; and she can ambulate only by pushing a walker instead of a lesser assistive device such as a cane, crutch, or standard walker. The need for this equipment was discussed with the patient and she understands and is in agreement.

## 2022-08-26 LAB
GLUCOSE BLD-MCNC: 129 MG/DL (ref 70–99)
GLUCOSE BLD-MCNC: 192 MG/DL (ref 70–99)
GLUCOSE BLD-MCNC: 271 MG/DL (ref 70–99)
GLUCOSE BLD-MCNC: 279 MG/DL (ref 70–99)

## 2022-08-26 PROCEDURE — 2580000003 HC RX 258: Performed by: ORTHOPAEDIC SURGERY

## 2022-08-26 PROCEDURE — 97116 GAIT TRAINING THERAPY: CPT

## 2022-08-26 PROCEDURE — 2580000003 HC RX 258: Performed by: ANESTHESIOLOGY

## 2022-08-26 PROCEDURE — 6370000000 HC RX 637 (ALT 250 FOR IP): Performed by: STUDENT IN AN ORGANIZED HEALTH CARE EDUCATION/TRAINING PROGRAM

## 2022-08-26 PROCEDURE — 97530 THERAPEUTIC ACTIVITIES: CPT

## 2022-08-26 PROCEDURE — 6360000002 HC RX W HCPCS: Performed by: ORTHOPAEDIC SURGERY

## 2022-08-26 PROCEDURE — 97535 SELF CARE MNGMENT TRAINING: CPT

## 2022-08-26 PROCEDURE — 1200000000 HC SEMI PRIVATE

## 2022-08-26 PROCEDURE — 6370000000 HC RX 637 (ALT 250 FOR IP): Performed by: INTERNAL MEDICINE

## 2022-08-26 PROCEDURE — 82962 GLUCOSE BLOOD TEST: CPT

## 2022-08-26 PROCEDURE — 6370000000 HC RX 637 (ALT 250 FOR IP): Performed by: ORTHOPAEDIC SURGERY

## 2022-08-26 PROCEDURE — 94761 N-INVAS EAR/PLS OXIMETRY MLT: CPT

## 2022-08-26 RX ORDER — METHOCARBAMOL 500 MG/1
500 TABLET, FILM COATED ORAL NIGHTLY
Status: COMPLETED | OUTPATIENT
Start: 2022-08-26 | End: 2022-08-26

## 2022-08-26 RX ADMIN — ACETAMINOPHEN 650 MG: 325 TABLET ORAL at 09:49

## 2022-08-26 RX ADMIN — ACETAMINOPHEN 650 MG: 325 TABLET ORAL at 00:26

## 2022-08-26 RX ADMIN — Medication 10 ML: at 22:02

## 2022-08-26 RX ADMIN — ENOXAPARIN SODIUM 30 MG: 100 INJECTION SUBCUTANEOUS at 09:51

## 2022-08-26 RX ADMIN — METOPROLOL SUCCINATE 25 MG: 25 TABLET, EXTENDED RELEASE ORAL at 17:41

## 2022-08-26 RX ADMIN — LISINOPRIL 5 MG: 5 TABLET ORAL at 09:48

## 2022-08-26 RX ADMIN — BISACODYL 5 MG: 5 TABLET, COATED ORAL at 09:48

## 2022-08-26 RX ADMIN — INSULIN LISPRO 8 UNITS: 100 INJECTION, SOLUTION INTRAVENOUS; SUBCUTANEOUS at 09:52

## 2022-08-26 RX ADMIN — ACETAMINOPHEN 650 MG: 325 TABLET ORAL at 21:34

## 2022-08-26 RX ADMIN — ACETAMINOPHEN 650 MG: 325 TABLET ORAL at 17:40

## 2022-08-26 RX ADMIN — AMLODIPINE BESYLATE 5 MG: 5 TABLET ORAL at 09:48

## 2022-08-26 RX ADMIN — INSULIN LISPRO 8 UNITS: 100 INJECTION, SOLUTION INTRAVENOUS; SUBCUTANEOUS at 12:52

## 2022-08-26 RX ADMIN — METHOCARBAMOL 500 MG: 500 TABLET ORAL at 21:33

## 2022-08-26 RX ADMIN — SODIUM CHLORIDE, PRESERVATIVE FREE 10 ML: 5 INJECTION INTRAVENOUS at 22:02

## 2022-08-26 RX ADMIN — ENOXAPARIN SODIUM 30 MG: 100 INJECTION SUBCUTANEOUS at 21:33

## 2022-08-26 RX ADMIN — INSULIN GLARGINE 25 UNITS: 100 INJECTION, SOLUTION SUBCUTANEOUS at 23:13

## 2022-08-26 RX ADMIN — INSULIN LISPRO 8 UNITS: 100 INJECTION, SOLUTION INTRAVENOUS; SUBCUTANEOUS at 23:14

## 2022-08-26 RX ADMIN — OXYCODONE HYDROCHLORIDE 5 MG: 5 TABLET ORAL at 09:49

## 2022-08-26 RX ADMIN — SODIUM CHLORIDE, PRESERVATIVE FREE 10 ML: 5 INJECTION INTRAVENOUS at 09:52

## 2022-08-26 RX ADMIN — Medication 10 ML: at 09:50

## 2022-08-26 RX ADMIN — INSULIN LISPRO 8 UNITS: 100 INJECTION, SOLUTION INTRAVENOUS; SUBCUTANEOUS at 09:51

## 2022-08-26 RX ADMIN — ACETAMINOPHEN 650 MG: 325 TABLET ORAL at 06:25

## 2022-08-26 ASSESSMENT — PAIN DESCRIPTION - ORIENTATION
ORIENTATION: LEFT

## 2022-08-26 ASSESSMENT — PAIN DESCRIPTION - LOCATION
LOCATION: HIP

## 2022-08-26 ASSESSMENT — PAIN SCALES - GENERAL
PAINLEVEL_OUTOF10: 6
PAINLEVEL_OUTOF10: 6
PAINLEVEL_OUTOF10: 0
PAINLEVEL_OUTOF10: 0
PAINLEVEL_OUTOF10: 2
PAINLEVEL_OUTOF10: 6

## 2022-08-26 ASSESSMENT — PAIN - FUNCTIONAL ASSESSMENT
PAIN_FUNCTIONAL_ASSESSMENT: PREVENTS OR INTERFERES SOME ACTIVE ACTIVITIES AND ADLS
PAIN_FUNCTIONAL_ASSESSMENT: ACTIVITIES ARE NOT PREVENTED
PAIN_FUNCTIONAL_ASSESSMENT: PREVENTS OR INTERFERES SOME ACTIVE ACTIVITIES AND ADLS
PAIN_FUNCTIONAL_ASSESSMENT: PREVENTS OR INTERFERES WITH MANY ACTIVE NOT PASSIVE ACTIVITIES

## 2022-08-26 ASSESSMENT — PAIN DESCRIPTION - DESCRIPTORS
DESCRIPTORS: ACHING

## 2022-08-26 ASSESSMENT — PAIN DESCRIPTION - PAIN TYPE
TYPE: ACUTE PAIN;CHRONIC PAIN
TYPE: ACUTE PAIN;CHRONIC PAIN

## 2022-08-26 NOTE — PROGRESS NOTES
V2.0  [facility] Hospitalist Progress Note      Name:  Kendy Valdivia /Age/Sex: 1940  (81 y.o. female)   MRN & CSN:  [id number] & [id number] Encounter Date/Time: 2022 11:12 AM EDT    Location:  -A PCP: Joey Hua MD       Hospital Day: 7    Assessment and Plan:   81 y.o. female with pmh of HTN, DM II who presents with Closed displaced fracture of left femoral neck ([address]    Plan:  Acute fracture left femoral neck secondary to fall at home. X-ray left hip showed fracture of the surgical neck of femur with extension into greater trochanter and mild shortening. Status post ORIF on 2022. Ortho cleared patient for discharge, PT OT May need rehab. Weightbearing all time. Lovenox for DVT prophylaxis along with SCD, GRISEL hose. Daily dressing change. Outpatient follow-up with Ortho as instructed  Incidental asymptomatic COVID-19 infection. Not a candidate for treatment not on steroids, not hypoxic. Afebrile. Essential hypertension  Acute anemia likely postop. Stable, range images 7 and 8. No obvious blood loss. No bleeding/hemorrhage from the surgical site. On insulin-dependent type 2 diabetes mellitus. Fluctuating blood sugar concern for compliance    Diet ADULT DIET; Regular; 3 carb choices (45 gm/meal); no pork products, no gelatin   DVT Prophylaxis [x] Lovenox, []  Heparin, [x] SCDs, [] Ambulation,  [] Eliquis, [] Xarelto  [] Coumadin   Code Status Full Code   Disposition From: Home  Expected Disposition: ARU  Estimated Date of Discharge: 2022  Patient requires continued admission due to pending placement   Surrogate Decision Maker/ POA      Subjective:     Patient seen and examined at bedside. Sitting in chair not in acute distress. Physically deconditioned, needs assistance even walking to the bedside commode. No reported fall. Encourage patient to stay out of bed, participate with PT. Had a lengthy discussion with patient's daughter at bedside.   Discussed management hour(s))   POCT Glucose    Collection Time: 08/25/22 11:55 AM   Result Value Ref Range    POC Glucose 222 (H) 70 - 99 MG/DL   POCT Glucose    Collection Time: 08/25/22  3:38 PM   Result Value Ref Range    POC Glucose 288 (H) 70 - 99 MG/DL   POCT Glucose    Collection Time: 08/25/22  7:40 PM   Result Value Ref Range    POC Glucose 199 (H) 70 - 99 MG/DL   POCT Glucose    Collection Time: 08/26/22  9:48 AM   Result Value Ref Range    POC Glucose 279 (H) 70 - 99 MG/DL        Imaging/Diagnostics Last 24 Hours   No results found.     Electronically signed by Tivis Oppenheim, MD on 8/26/2022 at 11:12 AM

## 2022-08-26 NOTE — PROGRESS NOTES
Comprehensive Nutrition Assessment    Type and Reason for Visit:  Reassess    Nutrition Recommendations/Plan:   Continue glucerna nutrition supplements   Continue CHO 3 diet. Please record PO intake TID      Malnutrition Assessment:  Malnutrition Status: At risk for malnutrition (Comment) (08/26/22 1611)    Context:  Acute Illness       Nutrition Assessment:    Pt with supplement changed to glucerna to help with blood sugar control. Pt with high blood sugar but not eating a lot per notes - but is drinking supplement. Blood sugar up to 300+ with stress, illness and steroids being contributing factors. Will continue diet and supplements as ordered. Pt now high nutrition risk. Nutrition Related Findings:    BG in the high 200's. H/H 7.5/23.7 Wound Type: Surgical Incision       Current Nutrition Intake & Therapies:    Average Meal Intake: Unable to assess  Average Supplements Intake: %  ADULT DIET; Regular; 3 carb choices (45 gm/meal); no pork products, no gelatin  ADULT ORAL NUTRITION SUPPLEMENT; Breakfast, Lunch, Dinner; Diabetic Oral Supplement    Anthropometric Measures:  Height: 5' 2\" (157.5 cm)  Ideal Body Weight (IBW): 110 lbs (50 kg)    Admission Body Weight: 153 lb 10.6 oz (69.7 kg)  Current Body Weight: 160 lb 15 oz (73 kg), 146.3 % IBW. Weight Source: Bed Scale  Current BMI (kg/m2): 29.4  Usual Body Weight:  (unknown. no report of weight loss PTA)                       BMI Categories: Overweight (BMI 25.0-29. 9)    Estimated Daily Nutrient Needs:  Energy Requirements Based On: Formula  Weight Used for Energy Requirements: Current  Energy (kcal/day): 6295-4954  Weight Used for Protein Requirements: Ideal  Protein (g/day): 60-75  Method Used for Fluid Requirements: 1 ml/kcal  Fluid (ml/day): 9040-8570    Nutrition Diagnosis:   Increased nutrient needs related to acute injury/trauma as evidenced by wounds    Nutrition Interventions:   Food and/or Nutrient Delivery: Continue Current Diet, Continue Oral Nutrition Supplement  Nutrition Education/Counseling: No recommendation at this time  Coordination of Nutrition Care: Continue to monitor while inpatient       Goals:  Previous Goal Met: Progressing toward Goal(s) (drinking supplements but not eating meals)  Goals: Meet at least 75% of estimated needs, by next RD assessment       Nutrition Monitoring and Evaluation:   Behavioral-Environmental Outcomes: None Identified  Food/Nutrient Intake Outcomes: Food and Nutrient Intake, Supplement Intake  Physical Signs/Symptoms Outcomes: Skin    Discharge Planning:    No discharge needs at this time     Radha Laguerre, [address] [place] [address],   Contact: [phone]

## 2022-08-26 NOTE — PROGRESS NOTES
transfer from chair min A (x 3 trials). Pt able to ambulate ~12 ft (3 trails x 4 feet each) with min A to mod A with RW. Pt required continued max coaching for weight-shifting, sequencing steps, and RW mgmt. Following final bout, pt transferred stand to sit to chair mod A for controlled descent and cues for reaching back with arms. Pt completed facial hygiene and grooming task of brushing hair with supervision at chair level. Pt left positioned for comfort in chair with all lines intact, all needs within reach, and chair alarm on. Assessment / Impression:    Patient's tolerance of treatment: Improved  Adverse Reaction: None  Significant change in status and impact: Continuing to improve slowly but steadily with each session  Barriers to improvement: Anxious behaviors    Plan for Next Session:    Continue per OT POC. Continue to recommend inpatient rehab at discharge. Pt is NOT currently SAFE/ABLE to return directly home. Requires more physical assist than her daughter is able to provide.     Time in: 1043  Time out: 1125  Timed treatment minutes: 42  Total treatment time: 43    Electronically signed by:    AMBREEN Troy/L, [address] [place] [phone]

## 2022-08-26 NOTE — CARE COORDINATION
Pt's insurance company requested information. See below. Kindly provide us as soon as possible with Mrs Win Fearing medical report, treatment plan and cost estimate for emergency review and rehab treatment. CM director asked ARU CLGinny, to email the requested documents. Awaiting Ginny's response. Stephane avery stated that she received permission to send the information to [facility]. If the insurance company approves pt will not be able to go to [facility] until after Wednesday, 8/31 due to her Covid status.

## 2022-08-26 NOTE — PROGRESS NOTES
Physical Therapy    Physical Therapy Treatment Note  Name: Niurka Mir MRN: [id number] :   1940   Date:  2022   Admission Date: 2022 Room:  -A   Restrictions/Precautions:          hip surgery (Left, 2022) WBAT LLE, droplet plus  Communication with other providers:  per nurse ok to tx and notified pt rating pain 6. Subjective:  Patient states:  agreeable to tx but c/o feeling very tired. Pt states \" I have no strength in my arms. Pain:   Location, Type, Intensity (0/10 to 10/10):  rates pain 6   Objective:    Observation:  alert and oriented but appears very fatigued. Treatment, including education/measures:  Sup<=>sit max assist and cues needing increased time with leg   Sit<=>stand from bed and chair max assist and cues for safety  Attempted amb with rw x 3 reps followed with chair but only able to take 1-2 steps then needing to sit down. Pt is unable to wt right LE and tends to scoot it forward. SPT chair to bed max assist.  Safety  Patient left safely in the bed, with call light/phone in reach with alarm applied. Gait belt and mask were used for transfers and gait. Assessment / Impression:      Patient's tolerance of treatment: fair   Adverse Reaction: na  Significant change in status and impact:  na  Barriers to improvement:  strength and safety  Plan for Next Session:    Cont.  POC  Time in:  1450  Time out:  1520  Timed treatment minutes:  30  Total treatment time:  30    Previously filed items:  Social/Functional History  Lives With: Daughter  Type of Home: House  Home Layout: Two level, Bed/Bath upstairs  Home Access: Stairs to enter without rails  Entrance Stairs - Number of Steps: 3  Entrance Stairs - Rails: None  Bathroom Shower/Tub: Tub/Shower unit, Walk-in shower  Bathroom Toilet: Standard  Bathroom Accessibility: Accessible  Has the patient had two or more falls in the past year or any fall with injury in the past year?: No  ADL Assistance: Independent  Homemaking Assistance: Independent  Homemaking Responsibilities: Yes  Ambulation Assistance: Independent (uses no AD)  Transfer Assistance: Independent  Active : No  Occupation: Retired  Additional Comments: Pt is visiting her daughter for 6 months from [address]  Time Frame for Short term goals: 2 weeks  Short term goal 1: Pt will perform sit><supine modA  Short term goal 2: Pt will transfer sit><stand Darell  Short term goal 3: Pt will transfer between surfaces Darell  Short term goal 4: Pt will ambulate 30ft with RW Darell    Electronically signed by:    Katheryn Washburn PTA  8/26/2022, 8:20 AM

## 2022-08-26 NOTE — CARE COORDINATION
Received request from Director of  to send ARU pricing information to [employer] upon their request.   Emailed [employer] with cost estimate provided by [employer] which was received by [employer]. Case Ref #[id number]. Notified  that IF insurance approves patient she will not be eligible for isolation removal until 8/31 per Infectious Prevention nurse. Will continue to follow for response from patients insurance.

## 2022-08-27 LAB
ANION GAP SERPL CALCULATED.3IONS-SCNC: 11 MMOL/L (ref 4–16)
BUN BLDV-MCNC: 24 MG/DL (ref 6–23)
CALCIUM SERPL-MCNC: 10.2 MG/DL (ref 8.3–10.6)
CHLORIDE BLD-SCNC: 91 MMOL/L (ref 99–110)
CO2: 23 MMOL/L (ref 21–32)
CREAT SERPL-MCNC: 0.6 MG/DL (ref 0.6–1.1)
GFR AFRICAN AMERICAN: >60 ML/MIN/1.73M2
GFR NON-AFRICAN AMERICAN: >60 ML/MIN/1.73M2
GLUCOSE BLD-MCNC: 120 MG/DL (ref 70–99)
GLUCOSE BLD-MCNC: 155 MG/DL (ref 70–99)
GLUCOSE BLD-MCNC: 242 MG/DL (ref 70–99)
GLUCOSE BLD-MCNC: 283 MG/DL (ref 70–99)
GLUCOSE BLD-MCNC: 291 MG/DL (ref 70–99)
GLUCOSE BLD-MCNC: 386 MG/DL (ref 70–99)
HCT VFR BLD CALC: 24.3 % (ref 37–47)
HEMOGLOBIN: 7.5 GM/DL (ref 12.5–16)
MCH RBC QN AUTO: 28.4 PG (ref 27–31)
MCHC RBC AUTO-ENTMCNC: 30.9 % (ref 32–36)
MCV RBC AUTO: 92 FL (ref 78–100)
PDW BLD-RTO: 16.4 % (ref 11.7–14.9)
PLATELET # BLD: 315 K/CU MM (ref 140–440)
PMV BLD AUTO: 9.7 FL (ref 7.5–11.1)
POTASSIUM SERPL-SCNC: 4.3 MMOL/L (ref 3.5–5.1)
RBC # BLD: 2.64 M/CU MM (ref 4.2–5.4)
SODIUM BLD-SCNC: 125 MMOL/L (ref 135–145)
WBC # BLD: 10.4 K/CU MM (ref 4–10.5)

## 2022-08-27 PROCEDURE — 94761 N-INVAS EAR/PLS OXIMETRY MLT: CPT

## 2022-08-27 PROCEDURE — 6370000000 HC RX 637 (ALT 250 FOR IP): Performed by: STUDENT IN AN ORGANIZED HEALTH CARE EDUCATION/TRAINING PROGRAM

## 2022-08-27 PROCEDURE — 97110 THERAPEUTIC EXERCISES: CPT

## 2022-08-27 PROCEDURE — 1200000000 HC SEMI PRIVATE

## 2022-08-27 PROCEDURE — 6360000002 HC RX W HCPCS: Performed by: ORTHOPAEDIC SURGERY

## 2022-08-27 PROCEDURE — 85027 COMPLETE CBC AUTOMATED: CPT

## 2022-08-27 PROCEDURE — 2580000003 HC RX 258: Performed by: ANESTHESIOLOGY

## 2022-08-27 PROCEDURE — 97530 THERAPEUTIC ACTIVITIES: CPT

## 2022-08-27 PROCEDURE — 82962 GLUCOSE BLOOD TEST: CPT

## 2022-08-27 PROCEDURE — 80048 BASIC METABOLIC PNL TOTAL CA: CPT

## 2022-08-27 PROCEDURE — 97116 GAIT TRAINING THERAPY: CPT

## 2022-08-27 PROCEDURE — 6370000000 HC RX 637 (ALT 250 FOR IP): Performed by: ORTHOPAEDIC SURGERY

## 2022-08-27 PROCEDURE — 2580000003 HC RX 258: Performed by: ORTHOPAEDIC SURGERY

## 2022-08-27 RX ADMIN — Medication 10 ML: at 08:32

## 2022-08-27 RX ADMIN — ACETAMINOPHEN 650 MG: 325 TABLET ORAL at 15:23

## 2022-08-27 RX ADMIN — ACETAMINOPHEN 650 MG: 325 TABLET ORAL at 21:44

## 2022-08-27 RX ADMIN — SODIUM CHLORIDE, PRESERVATIVE FREE 10 ML: 5 INJECTION INTRAVENOUS at 08:33

## 2022-08-27 RX ADMIN — INSULIN LISPRO 16 UNITS: 100 INJECTION, SOLUTION INTRAVENOUS; SUBCUTANEOUS at 11:59

## 2022-08-27 RX ADMIN — SODIUM CHLORIDE, PRESERVATIVE FREE 10 ML: 5 INJECTION INTRAVENOUS at 21:36

## 2022-08-27 RX ADMIN — LISINOPRIL 5 MG: 5 TABLET ORAL at 08:31

## 2022-08-27 RX ADMIN — ENOXAPARIN SODIUM 30 MG: 100 INJECTION SUBCUTANEOUS at 08:32

## 2022-08-27 RX ADMIN — INSULIN GLARGINE 25 UNITS: 100 INJECTION, SOLUTION SUBCUTANEOUS at 21:45

## 2022-08-27 RX ADMIN — INSULIN LISPRO 8 UNITS: 100 INJECTION, SOLUTION INTRAVENOUS; SUBCUTANEOUS at 21:44

## 2022-08-27 RX ADMIN — Medication 10 ML: at 21:37

## 2022-08-27 RX ADMIN — METOPROLOL SUCCINATE 25 MG: 25 TABLET, EXTENDED RELEASE ORAL at 18:37

## 2022-08-27 RX ADMIN — AMLODIPINE BESYLATE 5 MG: 5 TABLET ORAL at 08:32

## 2022-08-27 RX ADMIN — OXYCODONE HYDROCHLORIDE 5 MG: 5 TABLET ORAL at 11:57

## 2022-08-27 RX ADMIN — ENOXAPARIN SODIUM 30 MG: 100 INJECTION SUBCUTANEOUS at 21:43

## 2022-08-27 ASSESSMENT — PAIN DESCRIPTION - FREQUENCY: FREQUENCY: CONTINUOUS

## 2022-08-27 ASSESSMENT — PAIN - FUNCTIONAL ASSESSMENT
PAIN_FUNCTIONAL_ASSESSMENT: PREVENTS OR INTERFERES SOME ACTIVE ACTIVITIES AND ADLS
PAIN_FUNCTIONAL_ASSESSMENT: PREVENTS OR INTERFERES SOME ACTIVE ACTIVITIES AND ADLS
PAIN_FUNCTIONAL_ASSESSMENT: PREVENTS OR INTERFERES WITH MANY ACTIVE NOT PASSIVE ACTIVITIES
PAIN_FUNCTIONAL_ASSESSMENT: PREVENTS OR INTERFERES SOME ACTIVE ACTIVITIES AND ADLS

## 2022-08-27 ASSESSMENT — PAIN DESCRIPTION - DESCRIPTORS
DESCRIPTORS: ACHING

## 2022-08-27 ASSESSMENT — PAIN DESCRIPTION - ORIENTATION
ORIENTATION: LEFT

## 2022-08-27 ASSESSMENT — PAIN DESCRIPTION - ONSET: ONSET: ON-GOING

## 2022-08-27 ASSESSMENT — PAIN DESCRIPTION - PAIN TYPE
TYPE: ACUTE PAIN;CHRONIC PAIN
TYPE: ACUTE PAIN;SURGICAL PAIN
TYPE: ACUTE PAIN;SURGICAL PAIN
TYPE: ACUTE PAIN;CHRONIC PAIN

## 2022-08-27 ASSESSMENT — PAIN SCALES - GENERAL
PAINLEVEL_OUTOF10: 6
PAINLEVEL_OUTOF10: 6
PAINLEVEL_OUTOF10: 4
PAINLEVEL_OUTOF10: 6

## 2022-08-27 ASSESSMENT — PAIN DESCRIPTION - LOCATION
LOCATION: HIP

## 2022-08-27 NOTE — PROGRESS NOTES
Physical Therapy    Physical Therapy Treatment Note  Name: Blanca Marcelo MRN: [id number] :   1940   Date:  2022   Admission Date: 2022 Room:  -A   Restrictions/Precautions:          hip surgery (Left, 2022) WBAT LLE, droplet plus  Communication with other providers:  nurse gave pain meds during tx session. Co-tx with BAXTER for safety for am tx session  Subjective:  Patient states:  motivated and agreeable to tx  Pain:   Location, Type, Intensity (0/10 to 10/10):  5 at rest and 7 with activity   Objective:    Observation:  alert and oriented  Treatment, including education/measures:  Sit<=>stand mod assist of 2  Amb with rw 2 steps with max assist of 2 unable to amb further. Amb with cardiac walker mod assist of 2 and did well 6' x 1, 8' x 1  Pt and dghter given leg  ad reviewed uses to assist leg in/ou of bed and wit ex. Pt and dghter given written copy of ex: pt needs increased time and effort to complete ex with frequent rest breaks   Trunk stretches with deep breathing in am and pm  10 reps ap  10 reps quad sets with tactile and verbal cus to perform correctly  5 reps glut sets with cues  5 reps heel slides bilateral legs with leg  and min assist   5 reps abd/add bilateral legs with leg  and min assist  3 reps SLRs only on right with leg    Safety  Patient left safely in the chair, with call light/phone in reach with alarm applied. Gait belt and mask were used for transfers and gait. Assessment / Impression:      Patient's tolerance of treatment:  good   Adverse Reaction: na  Significant change in status and impact:  na  Barriers to improvement:  strength and safety  Plan for Next Session:    Cont. POC  Pt seen for 2 short txs due to family requesting written copy of ex.   Time am:  0287-7361  Time pm:  0188-5368  Timed treatment minutes: 30+25  Total treatment time:  54    Previously filed items:  Social/Functional History  Lives With: Daughter  Type of Home: House  Home Layout: Two level, Bed/Bath upstairs  Home Access: Stairs to enter without rails  Entrance Stairs - Number of Steps: 3  Entrance Stairs - Rails: None  Bathroom Shower/Tub: Tub/Shower unit, Walk-in shower  Bathroom Toilet: Standard  Bathroom Accessibility: Accessible  Has the patient had two or more falls in the past year or any fall with injury in the past year?: No  ADL Assistance: [address] [place] [address] Avenue: Independent  Homemaking Responsibilities: Yes  Ambulation Assistance: Independent (uses no AD)  Transfer Assistance: Independent  Active : No  Occupation: Retired  Additional Comments: Pt is visiting her daughter for 6 months from [address] for Short term goals: 2 weeks  Short term goal 1: Pt will perform sit><supine modA  Short term goal 2: Pt will transfer sit><stand Darell  Short term goal 3: Pt will transfer between surfaces Darell  Short term goal 4: Pt will ambulate 30ft with RW Darell    Electronically signed by:    Maribel Schrader PTA  8/27/2022, 1:15 PM

## 2022-08-27 NOTE — CARE COORDINATION
Pt was placed on the weekend follow up. Per CM notes we are waiting on precert for ARU. If pt is approved pt will not be able to to to ARU until Monday. LSW called Tori with ARU and precert is still pending. CM following.

## 2022-08-27 NOTE — PROGRESS NOTES
Occupational Therapy  . Occupational Therapy Treatment Note    Name: Rona Wilks MRN: [id number] :   1940   Date:  2022   Admission Date: 2022 Room:  -A     Primary Problem:    Lt hip intertrochanteric fracture s/p Trochanteric nailing, COVID-19    Restrictions/Precautions:          General Precautions, Fall Risk, WBAT Lt LE, Droplet Plus Isolation    Communication with other providers: Per chart review, patient is appropriate for therapeutic intervention. NA Jones dispensed pain meds during Tx session. Co-Tx c PTA Arvind Castro for functional transfers / mobility training. Subjective:  Patient states:  Pt agreeable to Tx session. Pain:   Location, Type, Intensity (0/10 to 10/10):  5/10, Lt LE, increased to 7/10 at end of Tx session. Objective:    Observation: Pt received seated in bedside chair, A&O. Daughter present and supportive. Objective Measures:  Purewick external catheter. Telemetry. Saline lock RUE. Treatment, including education:  Therapeutic Activity Training:   Therapeutic activity training was instructed today. Cues were given for safety, sequence, UE/LE placement, awareness, and balance. Activities performed today included transfer training for sit-stands and functional mobility. Educational review for rationale for therapeutic intervention and for self-pacing c rest breaks PRN to manage activity tolerance. Sit<>stands: Mod A x2 c RW and then to CW to facilitate BUE support, x3 trials. Standing balance / tolerance: Varied Min A x2 to Mod A x2 c AD x3 stands, 1-2 minutes each c rest breaks between    Functional Mobility: After Max A x2 c RW for two steps r/t heavy support on walker handles and unable to advance Rt LE w/o increased assist to Max A x2, trial c cardiac walker was introduced. Mod A x2 c CW 4 ft  and 8 ft c chair follow. With emphasis on functional reaching in dynamic sitting, pt performed face washing c Set up.      All therapeutic intervention performed c emphasis on dynamic balance / standing tolerance and functional mobility to inc strength, endurance and act tolerance for inc Indep c ADL tasks, func transfers / mobility. Safety  Patient safely in bedside chair as on approach at end of session, with call light/phone in reach, and nursing aware. Daughter present. Gait belt was used for func transfers / mobility. Assessment / Impression:    Patient's tolerance of treatment: Well - appears to be highly motivated, however, continues to appear limited by decreased endurance as evidenced by need for frequent rest breaks PRN. Adverse Reaction: None  Significant change in status and impact:  Progressed to 8 ft functional mobility c chair follow  Barriers to improvement: Pain / endurance / strength / balance      Plan for Next Session:    Continue per OT POC per patient's tolerance c plan to continue addressing functional transfers / mobility and standing balance / tolerance for ADL tasks. Plan to transition from [address] [employer] back to [address] [place] [address] when pt demonstrates increased activity tolerance for functional mobility. Time in:  1145  Time out:  1215  Timed treatment minutes:  30  Total treatment time:  30      Electronically signed by:    KIERA Luther  8/27/2022, 11:38 AM       Goals:  1. Pt will complete all aspects of bed mobility for EOB/OOB ADLs mod A with HOB flat  2. Pt will complete UB/LB bathing min A with setup using long handled sponge PRN  3. Pt will complete all aspects of LB dressing mod A with setup using AE PRN  4. Pt will complete all functional transfers to and from bed, chair, toilet, shower chair min A/good safety awareness  5. Pt will ambulate HH distance to bathroom for toileting min A with RW  6. Pt will complete all aspects of toileting task mod A on regular toilet  7. Pt will complete oral hygiene/grooming routine in unsupported sitting EOB SBA with setup  8.  Pt will complete ther ex/ther act with focus on UE strengthening and functional sitting/standing balance and tolerance

## 2022-08-27 NOTE — PROGRESS NOTES
V2.0  [facility] Hospitalist Progress Note      Name:  Gilda Umanzor /Age/Sex: 1940  (81 y.o. female)   MRN & CSN:  [id number] & [id number] Encounter Date/Time: 2022 10:32 AM EDT    Location:  -A PCP: Cata Villarreal MD       Hospital Day: 8    Assessment and Plan:     81 y.o. female with pmh of HTN, DM II who presents with Closed displaced fracture of left femoral neck ([address]     Plan: 1. Acute fracture left femoral neck secondary to fall at home. X-ray left hip showed fracture of the surgical neck of femur with extension into greater trochanter and mild shortening. Status post ORIF on 2022. Ortho cleared patient for discharge, PT OT May need rehab. Weightbearing all time. Lovenox for DVT prophylaxis along with SCD, GRISEL hose. Daily dressing change. Outpatient follow-up with Ortho as instructed  2. Incidental asymptomatic COVID-19 infection. Not a candidate for treatment not on steroids, not hypoxic. Afebrile. 3.Essential hypertension  4. Acute anemia likely postop. Stable, range images 7 and 8. No obvious blood loss. No bleeding/hemorrhage from the surgical site. 5.On insulin-dependent type 2 diabetes mellitus. Fluctuating blood sugar- concern for compliance  6. Repeat labs today    Diet ADULT DIET; Regular; 3 carb choices (45 gm/meal); no pork products, no gelatin  ADULT ORAL NUTRITION SUPPLEMENT; Breakfast, Lunch, Dinner; Diabetic Oral Supplement   DVT Prophylaxis [x] Lovenox, []  Heparin, [] SCDs, [] Ambulation,  [] Eliquis, [] Xarelto  [] Coumadin   Code Status Full Code   Disposition From: home  Expected Disposition: ARU  Estimated Date of Discharge: 22  Patient requires continued admission due to pending pre-cert    Surrogate Decision Maker/ POA      Subjective:     Patient seen and examined at bedside. In having physical therapy. No acute events overnight      Review of Systems:    Negative except above  Objective:      Intake/Output Summary (Last 24 hours) at 2022 [address] [facility] filed at 8/27/2022 0523  Gross per 24 hour   Intake --   Output 1500 ml   Net -1500 ml        Vitals:   Vitals:    08/27/22 0800   BP: 128/86   Pulse: 94   Resp: 17   Temp: 98.2 °F (36.8 °C)   SpO2: 98%       Physical Exam:     General: NAD  Eyes: EOMI  ENT: neck supple  Cardiovascular: Regular rate. Respiratory: Clear to auscultation  Gastrointestinal: Soft, non tender  Genitourinary: no suprapubic tenderness  Musculoskeletal: No edema  Skin: warm, dry. Clean surgical site  Neuro: Alert. Psych: Mood appropriate.      Medications:   Medications:    insulin glargine  25 Units SubCUTAneous Nightly    insulin lispro  8 Units SubCUTAneous 4x Daily AC & HS    insulin lispro  0-4 Units SubCUTAneous Nightly    insulin lispro  0-16 Units SubCUTAneous TID WC    sodium chloride flush  5-40 mL IntraVENous 2 times per day    enoxaparin  30 mg SubCUTAneous BID    acetaminophen  650 mg Oral Q6H    sodium chloride flush  5-40 mL IntraVENous 2 times per day    amLODIPine  5 mg Oral Daily    lisinopril  5 mg Oral Daily    metoprolol succinate  25 mg Oral QPM    bisacodyl  5 mg Oral Daily      Infusions:    sodium chloride      sodium chloride      dextrose      sodium chloride 100 mL/hr at 08/22/22 0449     PRN Meds: sodium chloride flush, 5-40 mL, PRN  sodium chloride, , PRN  oxyCODONE, 5 mg, Q4H PRN   Or  oxyCODONE, 10 mg, Q4H PRN  ondansetron, 4 mg, Q8H PRN   Or  ondansetron, 4 mg, Q6H PRN  sodium chloride flush, 5-40 mL, PRN  sodium chloride, 25 mL, PRN  glucose, 4 tablet, PRN  dextrose bolus, 125 mL, PRN   Or  dextrose bolus, 250 mL, PRN  glucagon (rDNA), 1 mg, PRN  dextrose, , Continuous PRN  sodium chloride flush, 5-40 mL, PRN  sodium chloride, , PRN  polyethylene glycol, 17 g, Daily PRN  guaiFENesin-dextromethorphan, 5 mL, Q4H PRN        Labs      Recent Results (from the past 24 hour(s))   POCT Glucose    Collection Time: 08/26/22 11:19 AM   Result Value Ref Range    POC Glucose 192 (H) 70 - 99 MG/DL POCT Glucose    Collection Time: 08/26/22  5:34 PM   Result Value Ref Range    POC Glucose 129 (H) 70 - 99 MG/DL   POCT Glucose    Collection Time: 08/26/22 10:01 PM   Result Value Ref Range    POC Glucose 271 (H) 70 - 99 MG/DL   POCT Glucose    Collection Time: 08/27/22  7:25 AM   Result Value Ref Range    POC Glucose 120 (H) 70 - 99 MG/DL        Imaging/Diagnostics Last 24 Hours   No results found.     Electronically signed by Zander Guerra MD on 8/27/2022 at 10:32 AM

## 2022-08-28 LAB
ANION GAP SERPL CALCULATED.3IONS-SCNC: 9 MMOL/L (ref 4–16)
BUN BLDV-MCNC: 28 MG/DL (ref 6–23)
CALCIUM SERPL-MCNC: 10 MG/DL (ref 8.3–10.6)
CHLORIDE BLD-SCNC: 97 MMOL/L (ref 99–110)
CO2: 26 MMOL/L (ref 21–32)
CREAT SERPL-MCNC: 0.6 MG/DL (ref 0.6–1.1)
GFR AFRICAN AMERICAN: >60 ML/MIN/1.73M2
GFR NON-AFRICAN AMERICAN: >60 ML/MIN/1.73M2
GLUCOSE BLD-MCNC: 133 MG/DL (ref 70–99)
GLUCOSE BLD-MCNC: 134 MG/DL (ref 70–99)
GLUCOSE BLD-MCNC: 159 MG/DL (ref 70–99)
GLUCOSE BLD-MCNC: 194 MG/DL (ref 70–99)
GLUCOSE BLD-MCNC: 229 MG/DL (ref 70–99)
GLUCOSE BLD-MCNC: 338 MG/DL (ref 70–99)
POTASSIUM SERPL-SCNC: 4 MMOL/L (ref 3.5–5.1)
SODIUM BLD-SCNC: 132 MMOL/L (ref 135–145)

## 2022-08-28 PROCEDURE — 1200000000 HC SEMI PRIVATE

## 2022-08-28 PROCEDURE — 2580000003 HC RX 258: Performed by: ANESTHESIOLOGY

## 2022-08-28 PROCEDURE — 36415 COLL VENOUS BLD VENIPUNCTURE: CPT

## 2022-08-28 PROCEDURE — 82962 GLUCOSE BLOOD TEST: CPT

## 2022-08-28 PROCEDURE — 6370000000 HC RX 637 (ALT 250 FOR IP): Performed by: STUDENT IN AN ORGANIZED HEALTH CARE EDUCATION/TRAINING PROGRAM

## 2022-08-28 PROCEDURE — 6360000002 HC RX W HCPCS: Performed by: ORTHOPAEDIC SURGERY

## 2022-08-28 PROCEDURE — 94761 N-INVAS EAR/PLS OXIMETRY MLT: CPT

## 2022-08-28 PROCEDURE — 6370000000 HC RX 637 (ALT 250 FOR IP): Performed by: ORTHOPAEDIC SURGERY

## 2022-08-28 PROCEDURE — 2580000003 HC RX 258: Performed by: ORTHOPAEDIC SURGERY

## 2022-08-28 PROCEDURE — 80048 BASIC METABOLIC PNL TOTAL CA: CPT

## 2022-08-28 RX ADMIN — ACETAMINOPHEN 650 MG: 325 TABLET ORAL at 18:48

## 2022-08-28 RX ADMIN — ENOXAPARIN SODIUM 30 MG: 100 INJECTION SUBCUTANEOUS at 08:58

## 2022-08-28 RX ADMIN — ACETAMINOPHEN 650 MG: 325 TABLET ORAL at 21:03

## 2022-08-28 RX ADMIN — INSULIN LISPRO 8 UNITS: 100 INJECTION, SOLUTION INTRAVENOUS; SUBCUTANEOUS at 06:39

## 2022-08-28 RX ADMIN — AMLODIPINE BESYLATE 5 MG: 5 TABLET ORAL at 08:58

## 2022-08-28 RX ADMIN — ACETAMINOPHEN 650 MG: 325 TABLET ORAL at 06:29

## 2022-08-28 RX ADMIN — METOPROLOL SUCCINATE 25 MG: 25 TABLET, EXTENDED RELEASE ORAL at 18:48

## 2022-08-28 RX ADMIN — SODIUM CHLORIDE, PRESERVATIVE FREE 10 ML: 5 INJECTION INTRAVENOUS at 09:00

## 2022-08-28 RX ADMIN — Medication 10 ML: at 08:59

## 2022-08-28 RX ADMIN — ENOXAPARIN SODIUM 30 MG: 100 INJECTION SUBCUTANEOUS at 21:11

## 2022-08-28 RX ADMIN — INSULIN LISPRO 4 UNITS: 100 INJECTION, SOLUTION INTRAVENOUS; SUBCUTANEOUS at 21:04

## 2022-08-28 RX ADMIN — LISINOPRIL 5 MG: 5 TABLET ORAL at 08:58

## 2022-08-28 RX ADMIN — INSULIN LISPRO 4 UNITS: 100 INJECTION, SOLUTION INTRAVENOUS; SUBCUTANEOUS at 12:52

## 2022-08-28 RX ADMIN — INSULIN GLARGINE 25 UNITS: 100 INJECTION, SOLUTION SUBCUTANEOUS at 21:04

## 2022-08-28 RX ADMIN — ACETAMINOPHEN 650 MG: 325 TABLET ORAL at 13:00

## 2022-08-28 RX ADMIN — INSULIN LISPRO 8 UNITS: 100 INJECTION, SOLUTION INTRAVENOUS; SUBCUTANEOUS at 12:53

## 2022-08-28 ASSESSMENT — PAIN DESCRIPTION - DESCRIPTORS: DESCRIPTORS: ACHING

## 2022-08-28 ASSESSMENT — PAIN SCALES - GENERAL
PAINLEVEL_OUTOF10: 0
PAINLEVEL_OUTOF10: 6
PAINLEVEL_OUTOF10: 3
PAINLEVEL_OUTOF10: 4
PAINLEVEL_OUTOF10: 0
PAINLEVEL_OUTOF10: 6

## 2022-08-28 ASSESSMENT — PAIN DESCRIPTION - LOCATION
LOCATION: HIP
LOCATION: HIP

## 2022-08-28 ASSESSMENT — PAIN DESCRIPTION - FREQUENCY: FREQUENCY: CONTINUOUS

## 2022-08-28 ASSESSMENT — PAIN DESCRIPTION - ORIENTATION: ORIENTATION: LEFT

## 2022-08-28 ASSESSMENT — PAIN - FUNCTIONAL ASSESSMENT: PAIN_FUNCTIONAL_ASSESSMENT: PREVENTS OR INTERFERES WITH MANY ACTIVE NOT PASSIVE ACTIVITIES

## 2022-08-28 ASSESSMENT — PAIN DESCRIPTION - ONSET: ONSET: ON-GOING

## 2022-08-28 ASSESSMENT — PAIN DESCRIPTION - PAIN TYPE: TYPE: ACUTE PAIN;SURGICAL PAIN

## 2022-08-28 NOTE — PROGRESS NOTES
Subjective:     Patient seen and examined at bedside. Reports feeling well overall. Denies any acute discomfort or pain. Pain has improved in left lower extremity. Review of Systems:        Objective: Intake/Output Summary (Last 24 hours) at 8/28/2022 0839  Last data filed at 8/28/2022 0629  Gross per 24 hour   Intake --   Output 1600 ml   Net -1600 ml        Vitals:   Vitals:    08/28/22 0400   BP:    Pulse:    Resp:    Temp: 98.8 °F (37.1 °C)   SpO2: 100%       Physical Exam:     General: NAD  Eyes: EOMI  ENT: neck supple  Cardiovascular: Regular rate. Respiratory: Clear to auscultation  Gastrointestinal: Soft, non tender  Genitourinary: no suprapubic tenderness  Musculoskeletal: No edema  Skin: warm, dry, resolving bruise at left hip  Neuro: Alert. Psych: Mood appropriate.      Medications:   Medications:    insulin glargine  25 Units SubCUTAneous Nightly    insulin lispro  8 Units SubCUTAneous 4x Daily AC & HS    insulin lispro  0-4 Units SubCUTAneous Nightly    insulin lispro  0-16 Units SubCUTAneous TID WC    sodium chloride flush  5-40 mL IntraVENous 2 times per day    enoxaparin  30 mg SubCUTAneous BID    acetaminophen  650 mg Oral Q6H    sodium chloride flush  5-40 mL IntraVENous 2 times per day    amLODIPine  5 mg Oral Daily    lisinopril  5 mg Oral Daily    metoprolol succinate  25 mg Oral QPM    bisacodyl  5 mg Oral Daily      Infusions:    sodium chloride      sodium chloride      dextrose      sodium chloride 100 mL/hr at 08/22/22 0449     PRN Meds: sodium chloride flush, 5-40 mL, PRN  sodium chloride, , PRN  oxyCODONE, 5 mg, Q4H PRN   Or  oxyCODONE, 10 mg, Q4H PRN  ondansetron, 4 mg, Q8H PRN   Or  ondansetron, 4 mg, Q6H PRN  sodium chloride flush, 5-40 mL, PRN  sodium chloride, 25 mL, PRN  glucose, 4 tablet, PRN  dextrose bolus, 125 mL, PRN   Or  dextrose bolus, 250 mL, PRN  glucagon (rDNA), 1 mg, PRN  dextrose, , Continuous PRN  sodium chloride flush, 5-40 mL, PRN  sodium chloride, , PRN  polyethylene glycol, 17 g, Daily PRN  guaiFENesin-dextromethorphan, 5 mL, Q4H PRN        Labs      Recent Results (from the past 24 hour(s))   CBC    Collection Time: 08/27/22 11:00 AM   Result Value Ref Range    WBC 10.4 4.0 - 10.5 K/CU MM    RBC 2.64 (L) 4.2 - 5.4 M/CU MM    Hemoglobin 7.5 (L) 12.5 - 16.0 GM/DL    Hematocrit 24.3 (L) 37 - 47 %    MCV 92.0 78 - 100 FL    MCH 28.4 27 - 31 PG    MCHC 30.9 (L) 32.0 - 36.0 %    RDW 16.4 (H) 11.7 - 14.9 %    Platelets 375 988 - 101 K/CU MM    MPV 9.7 7.5 - 11.1 FL   Basic Metabolic Panel    Collection Time: 08/27/22 11:00 AM   Result Value Ref Range    Sodium 125 (L) 135 - 145 MMOL/L    Potassium 4.3 3.5 - 5.1 MMOL/L    Chloride 91 (L) 99 - 110 mMol/L    CO2 23 21 - 32 MMOL/L    Anion Gap 11 4 - 16    BUN 24 (H) 6 - 23 MG/DL    Creatinine 0.6 0.6 - 1.1 MG/DL    Glucose 283 (H) 70 - 99 MG/DL    Calcium 10.2 8.3 - 10.6 MG/DL    GFR Non-African American >60 >60 mL/min/1.73m2    GFR African American >60 >60 mL/min/1.73m2   POCT Glucose    Collection Time: 08/27/22 11:09 AM   Result Value Ref Range    POC Glucose 386 (H) 70 - 99 MG/DL   POCT Glucose    Collection Time: 08/27/22  2:00 PM   Result Value Ref Range    POC Glucose 242 (H) 70 - 99 MG/DL   POCT Glucose    Collection Time: 08/27/22  4:05 PM   Result Value Ref Range    POC Glucose 155 (H) 70 - 99 MG/DL   POCT Glucose    Collection Time: 08/27/22  8:21 PM   Result Value Ref Range    POC Glucose 291 (H) 70 - 99 MG/DL   POCT Glucose    Collection Time: 08/28/22  6:28 AM   Result Value Ref Range    POC Glucose 159 (H) 70 - 99 MG/DL   POCT Glucose    Collection Time: 08/28/22  7:19 AM   Result Value Ref Range    POC Glucose 194 (H) 70 - 99 MG/DL        Imaging/Diagnostics Last 24 Hours   No results found.     Electronically signed by Delbert Bergman MD on 8/28/2022 at 8:39 AM

## 2022-08-29 LAB
ANION GAP SERPL CALCULATED.3IONS-SCNC: 6 MMOL/L (ref 4–16)
BUN BLDV-MCNC: 19 MG/DL (ref 6–23)
CALCIUM SERPL-MCNC: 10 MG/DL (ref 8.3–10.6)
CHLORIDE BLD-SCNC: 103 MMOL/L (ref 99–110)
CO2: 27 MMOL/L (ref 21–32)
CREAT SERPL-MCNC: 0.6 MG/DL (ref 0.6–1.1)
GFR AFRICAN AMERICAN: >60 ML/MIN/1.73M2
GFR NON-AFRICAN AMERICAN: >60 ML/MIN/1.73M2
GLUCOSE BLD-MCNC: 161 MG/DL (ref 70–99)
GLUCOSE BLD-MCNC: 164 MG/DL (ref 70–99)
GLUCOSE BLD-MCNC: 207 MG/DL (ref 70–99)
GLUCOSE BLD-MCNC: 293 MG/DL (ref 70–99)
POTASSIUM SERPL-SCNC: 4.2 MMOL/L (ref 3.5–5.1)
SODIUM BLD-SCNC: 136 MMOL/L (ref 135–145)

## 2022-08-29 PROCEDURE — 97530 THERAPEUTIC ACTIVITIES: CPT

## 2022-08-29 PROCEDURE — 94761 N-INVAS EAR/PLS OXIMETRY MLT: CPT

## 2022-08-29 PROCEDURE — 6370000000 HC RX 637 (ALT 250 FOR IP): Performed by: ORTHOPAEDIC SURGERY

## 2022-08-29 PROCEDURE — 97535 SELF CARE MNGMENT TRAINING: CPT

## 2022-08-29 PROCEDURE — 97116 GAIT TRAINING THERAPY: CPT

## 2022-08-29 PROCEDURE — 6370000000 HC RX 637 (ALT 250 FOR IP): Performed by: STUDENT IN AN ORGANIZED HEALTH CARE EDUCATION/TRAINING PROGRAM

## 2022-08-29 PROCEDURE — 82962 GLUCOSE BLOOD TEST: CPT

## 2022-08-29 PROCEDURE — 36415 COLL VENOUS BLD VENIPUNCTURE: CPT

## 2022-08-29 PROCEDURE — 80048 BASIC METABOLIC PNL TOTAL CA: CPT

## 2022-08-29 PROCEDURE — 2580000003 HC RX 258: Performed by: ORTHOPAEDIC SURGERY

## 2022-08-29 PROCEDURE — 6360000002 HC RX W HCPCS: Performed by: ORTHOPAEDIC SURGERY

## 2022-08-29 PROCEDURE — 2580000003 HC RX 258: Performed by: ANESTHESIOLOGY

## 2022-08-29 PROCEDURE — 1200000000 HC SEMI PRIVATE

## 2022-08-29 PROCEDURE — 6370000000 HC RX 637 (ALT 250 FOR IP): Performed by: HOSPITALIST

## 2022-08-29 PROCEDURE — 97110 THERAPEUTIC EXERCISES: CPT

## 2022-08-29 RX ORDER — INSULIN GLARGINE 100 [IU]/ML
38 INJECTION, SOLUTION SUBCUTANEOUS NIGHTLY
Status: DISCONTINUED | OUTPATIENT
Start: 2022-08-29 | End: 2022-08-31 | Stop reason: HOSPADM

## 2022-08-29 RX ADMIN — ACETAMINOPHEN 650 MG: 325 TABLET ORAL at 18:08

## 2022-08-29 RX ADMIN — ACETAMINOPHEN 650 MG: 325 TABLET ORAL at 23:26

## 2022-08-29 RX ADMIN — LISINOPRIL 5 MG: 5 TABLET ORAL at 09:54

## 2022-08-29 RX ADMIN — Medication 10 ML: at 09:54

## 2022-08-29 RX ADMIN — AMLODIPINE BESYLATE 5 MG: 5 TABLET ORAL at 09:54

## 2022-08-29 RX ADMIN — METOPROLOL SUCCINATE 25 MG: 25 TABLET, EXTENDED RELEASE ORAL at 18:08

## 2022-08-29 RX ADMIN — INSULIN LISPRO 8 UNITS: 100 INJECTION, SOLUTION INTRAVENOUS; SUBCUTANEOUS at 21:33

## 2022-08-29 RX ADMIN — INSULIN LISPRO 4 UNITS: 100 INJECTION, SOLUTION INTRAVENOUS; SUBCUTANEOUS at 12:19

## 2022-08-29 RX ADMIN — ACETAMINOPHEN 650 MG: 325 TABLET ORAL at 06:40

## 2022-08-29 RX ADMIN — SODIUM CHLORIDE, PRESERVATIVE FREE 10 ML: 5 INJECTION INTRAVENOUS at 22:47

## 2022-08-29 RX ADMIN — ENOXAPARIN SODIUM 30 MG: 100 INJECTION SUBCUTANEOUS at 21:31

## 2022-08-29 RX ADMIN — ACETAMINOPHEN 650 MG: 325 TABLET ORAL at 12:19

## 2022-08-29 RX ADMIN — Medication 10 ML: at 22:48

## 2022-08-29 RX ADMIN — ENOXAPARIN SODIUM 30 MG: 100 INJECTION SUBCUTANEOUS at 09:54

## 2022-08-29 RX ADMIN — INSULIN LISPRO 8 UNITS: 100 INJECTION, SOLUTION INTRAVENOUS; SUBCUTANEOUS at 06:41

## 2022-08-29 RX ADMIN — INSULIN LISPRO 8 UNITS: 100 INJECTION, SOLUTION INTRAVENOUS; SUBCUTANEOUS at 12:19

## 2022-08-29 RX ADMIN — OXYCODONE HYDROCHLORIDE 5 MG: 5 TABLET ORAL at 09:53

## 2022-08-29 RX ADMIN — BISACODYL 5 MG: 5 TABLET, COATED ORAL at 09:54

## 2022-08-29 RX ADMIN — INSULIN GLARGINE 38 UNITS: 100 INJECTION, SOLUTION SUBCUTANEOUS at 21:30

## 2022-08-29 ASSESSMENT — PAIN SCALES - GENERAL
PAINLEVEL_OUTOF10: 0
PAINLEVEL_OUTOF10: 0

## 2022-08-29 NOTE — PROGRESS NOTES
[facility] HOSPITALIST PROGRESS NOTE      PCP: Bessie Chau MD    Date of Admission: 8/20/2022    Subjective: pain when she [place] 1959 summary patient is an 60-year-old female with history of hypertension, diabetes who was admitted on 8/21 status post fall. X-ray left hip showed fracture of surgical neck of femur with greater trochanter involvement. Underwent open reduction internal fixation on 8/21. PT OT consulted, recommended SNF placement. Postoperatively hemoglobin dropped to 7.2,  Positive for COVID-19, no steroids indicated. Has remained stable on room air  8/27 hyponatremic, corrected sodium 129, IV fluids   started    Vitals signs:  Afebrile, heart rate 88, blood pressure 135/66, on room air  Medications: Tylenol, amlodipine, Lovenox, Lantus, sliding scale insulin, lisinopril, metoprolol    Antibiotics: None    Fluid status: 1200 cc    Labs:   Sodium 132, potassium 4, chloride 97, bicarb 26, creatinine 0.6    Imaging: X-ray left hip  Impression:          Fracture of the lateral left femoral neck at the junction with the   intratrochanteric region of the left proximal femur which is not definitively   involved. There is varus angulation of the fracture. Femoral head to   acetabular alignment remains intact.         Assessment/Plan:     Acute left femur neck fracture status post fall:   Status post open reduction internal fixation  PT OT consulted, awaiting rehab placement    Hyponatremia: Sodium 125 on 8/27, sodium 132 today  Encourage p.o. intake    ABLA: Postoperative blood loss  Continue iron infusion  Transfuse as needed as needed    Diabetes mellitus type 2: Sliding scale insulin, diabetic diet  Glucose 164-338 range, increase Lantus to 30 units nightly    Essential hypertension: Amlodipine, lisinopril, Toprol, as needed hydralazine    COVID-19: Upper respiratory tract infection  Incidental finding, asymptomatic, CRP trending downwards  Continue droplet and contact precautions    DVT prophlaxis:   Lovenox    Last BM:   8/26    Ambulation:   PT OT consulted    Disposition:   Awaiting SNF placement, have to wait till 8/31 to go to ARU due to her COVID status    Diet: Diabetic diet    Physical Exam Performed:       BP (!) 155/66   Pulse 88   Temp 97 °F (36.1 °C) (Oral)   Resp 17   Ht 5' 2\" (1.575 m)   Wt 163 lb 12.8 oz (74.3 kg)   SpO2 100%   BMI 29.96 kg/m²     Physical Exam  Constitutional:       General: She is not in acute distress. Appearance: Normal appearance. HENT:      Head: Normocephalic and atraumatic. Right Ear: External ear normal.      Left Ear: External ear normal.   Eyes:      Extraocular Movements: Extraocular movements intact. Pupils: Pupils are equal, round, and reactive to light. Cardiovascular:      Rate and Rhythm: Normal rate and regular rhythm. Heart sounds: No murmur heard. Pulmonary:      Effort: Pulmonary effort is normal. No respiratory distress. Breath sounds: Normal breath sounds. No wheezing. Abdominal:      General: Bowel sounds are normal. There is no distension. Palpations: Abdomen is soft. Tenderness: There is no abdominal tenderness. Musculoskeletal:         General: No swelling. Cervical back: Normal range of motion. Comments: Left hip dressing in place   Skin:     General: Skin is warm. Neurological:      General: No focal deficit present. Mental Status: She is alert and oriented to person, place, and time. Cranial Nerves: No cranial nerve deficit. Psychiatric:         Mood and Affect: Mood normal.     Labs:   Recent Labs     08/27/22  1100   WBC 10.4   HGB 7.5*   HCT 24.3*        Recent Labs     08/27/22  1100 08/28/22  1517   * 132*   K 4.3 4.0   CL 91* 97*   CO2 23 26   BUN 24* 28*   CREATININE 0.6 0.6   CALCIUM 10.2 10.0     No results for input(s): AST, ALT, BILIDIR, BILITOT, ALKPHOS in the last 72 hours.   No results for input(s): INR in the last 72 hours. No results for input(s): Kinga Al[place] in the last 72 hours. Urinalysis:    No results found for: Aletta May, BACTERIA, RBCUA, BLOODU, SPECGRAV, GLUCOSEU    Radiology:  XR CHEST PORTABLE   Final Result   Near complete resolution bilateral hilar and suprahilar infiltrates   consistent with improving pneumonia. Otherwise, radiographically nonacute   portable chest.         FL LESS THAN 1 HOUR   Final Result   Intraprocedural fluoroscopic spot images as above. See separate procedure   report for more information. XR HIP 2-3 VW W PELVIS LEFT   Final Result      Fracture of the lateral left femoral neck at the junction with the   intratrochanteric region of the left proximal femur which is not definitively   involved. There is varus angulation of the fracture. Femoral head to   acetabular alignment remains intact. XR CHEST PORTABLE   Final Result   Mild hazy airspace opacities to bilateral upper lung zones, nonspecific but   would be compatible with COVID-19 pneumonia given the clinical history.                  Marcell Coles MD  8/29/2022 6:58 AM

## 2022-08-29 NOTE — PROGRESS NOTES
Occupational Therapy  . Occupational Therapy Treatment Note    Name: Lola Myers MRN: [id number] :   1940   Date:  2022   Admission Date: 2022 Room:  -A     Primary Problem:      Restrictions/Precautions:          General Precautions, Fall Risk, WBAT Lt LE, Droplet Plus Isolation    Communication with other providers: Per chart review, patient is appropriate for therapeutic intervention. Co-Tx c PTA Adrienne for simultaneous skilled therapeutic intervention during functional transfers / mobility. Subjective:  Patient states:  Pt agreeable to Tx session. \"I liked the cardiac walker because it gave me more support on my right arm. \" Pt encouraged and educated for rationale to trial RW this date. Pain:   Location, Type, Intensity (0/10 to 10/10):  5/10, Lt LE    Objective:    Observation: Pt received seated in bedside chair, A&O to self / situation, daughter present throughout and supportive. RUE weakness noted during transfers which pt identifies as baseline. Objective Measures:  Telemetry    Treatment, including education:  Therapeutic Activity Training:   Therapeutic activity training was instructed today. Cues were given for safety, sequence, UE/LE placement, awareness, and balance. Activities performed today included sit-stand, SPT. Sit to stands: Min A x2 c RW + mod cues for safe body positioning / hand placement  Stand to sits: Mod A x2 c RW  for controlled descent + cues as above     Standing balance / tolerance: Min A x1 static stands / Min A x2 during dynamic reaching c RW, cues PRN for safe body positioning  Multiple stands, all <2 minutes    Functional Mobility: Mod A x2 c RW for 3 ft x3 trials. Self Care Training:   Cues were given for safety, sequence, UE/LE placement, visual cues, and balance. Activities performed today included toileting, hand hygiene, and grooming. Toilet Transfer:  Mod A x2 c RW + max cues for safe body positioning / hand placement and for walker advancement. See PT note for additional details. Toileting: Dep for clothing mgmt up / down and steadying assist (two person). Pt did follow cues to perform clothing adjustment of brief in the front c Min A x2 steadying assist to address standing balance / tolerance during functional reaching tasks. Grooming: Set up seated to perform face washing, hand hygiene and hair care. All therapeutic intervention performed c emphasis on dynamic balance / standing tolerance to inc strength, endurance and act tolerance for inc Indep c ADL tasks, func transfers / mobility. Safety  Patient safely in bedside chair + alarm set at end of session, with call light/phone in reach, and nursing aware. Gait belt was used for func transfers / mobility. Assessment / Impression:    Patient's tolerance of treatment: Well  Adverse Reaction: None  Significant change in status and impact:  Actively participated, progressing c sit to stands and take a few steps c RW this date vs use of cardiac walker on Saturday. Barriers to improvement: Appears limited by pain, decreased strength and anxiety for falling. Plan for Next Session:    Continue per OT POC per patient's tolerance and c emphasis on standing balance, functional transfers and mobility during ADL tasks. Time in:  1125  Time out:  1205  Timed treatment minutes:  40  Total treatment time:  40      Electronically signed by:    KIERA Yap  8/29/2022, 1:05 PM    Goals:  1. Pt will complete all aspects of bed mobility for EOB/OOB ADLs mod A with HOB flat  2. Pt will complete UB/LB bathing min A with setup using long handled sponge PRN  3. Pt will complete all aspects of LB dressing mod A with setup using AE PRN  4. Pt will complete all functional transfers to and from bed, chair, toilet, shower chair min A/good safety awareness  5. Pt will ambulate HH distance to bathroom for toileting min A with RW  6.  Pt will complete all aspects of toileting task mod A on regular toilet  7. Pt will complete oral hygiene/grooming routine in unsupported sitting EOB SBA with setup  8.  Pt will complete ther ex/ther act with focus on UE strengthening and functional sitting/standing balance and tolerance

## 2022-08-29 NOTE — PROGRESS NOTES
Physical Therapy  Name: Fernanda Ryan MRN: [id number] :   1940   Date:  2022   Admission Date: 2022 Room:  -A   Restrictions/Precautions:        hip surgery (Left, 2022) WBAT LLE, droplet plus  Communication with other providers:   pt had her call light on. Co-treat with BAXTER for safety and level of assist needed  Subjective:  Patient states:  she needed to go to the [facility] and does not want the pur-wik in anymore  Pain:   Location, Type, Intensity (0/10 to 10/10):  L hip with movement  Objective:    Observation:  pt was sitting up in the chair,   Treatment, including education/measures:  Transfers with line management of tele  Sit to stand :min A of 2, from chair, BSC   Stand to sit :mod a of 2 to [facility] and chair with max cues for hand placements  Gait:  Pt amb with RW for 3 ft x 3 with mod A of 2  that progressed to min A of 2  Pt needed VC's for WB status and having confidence that her L LE can support her weight  Gait Comments: with VC's' and TC's for B LE placement, walker placement and sequence throughout ambulation; with VC's and TC's to maintain upright posture in order to avoid COM shifting outside of FARA; with VC's for PLB throughout ambulation  Sitting Exercises: Ankle pumps x 10  Marching x 10   Daughter had done some ex with her mom earlier with the leg , gave education for progression of with leg  to no leg   Therapeutic Exercise:  Therapeutic exercises were instructed today. Cues were given for technique, safety, recruitment, and rationale. Cues were verbal and/or tactile. Safety  Patient left safely in the chair, with call light/phone in reach with alarm applied. Gait belt and mask were used for transfers and gait.   Assessment / Impression:     Patient's tolerance of treatment:  good, very motivated and improving daily with transition from CW to RW   Adverse Reaction: none  Significant change in status and impact:  none  Barriers to improvement:  not trusting her L LE for walking, pain and weakness on the R UE  Plan for Next Session:    Will cont to work towards pt's goals per her tolerance  Time in:  1125  Time out:  1205  Timed treatment minutes:  40  Total treatment time:  40  Previously filed items:  Social/Functional History  Lives With: Daughter  Type of Home: House  Home Layout: Two level, Bed/Bath upstairs  Home Access: Stairs to enter without rails  Entrance Stairs - Number of Steps: 3  Entrance Stairs - Rails: None  Bathroom Shower/Tub: Tub/Shower unit, Walk-in shower  Bathroom Toilet: Standard  Bathroom Accessibility: Accessible  Has the patient had two or more falls in the past year or any fall with injury in the past year?: No  ADL Assistance: [address] [place] [address] Avenue: Independent  Homemaking Responsibilities: Yes  Ambulation Assistance: Independent (uses no AD)  Transfer Assistance: Independent  Active : No  Occupation: Retired  Additional Comments: Pt is visiting her daughter for 6 months from [address] [place] [address] for Short term goals: 2 weeks  Short term goal 1: Pt will perform sit><supine modA  Short term goal 2: Pt will transfer sit><stand Darell  Short term goal 3: Pt will transfer between surfaces Darell  Short term goal 4: Pt will ambulate 30ft with RW Darell     Electronically signed by:     Laury Cabrera PTA  8/29/2022, 1:05 PM

## 2022-08-29 NOTE — CARE COORDINATION
ARU still following for response from patients [employer] insurance. ARU sent cost estimate information requested by [facility] via secured email. ARU will need confirmation that patient will be covered by [facility] prior to accepting patient. Have yet to present to Dr. Ken Manuel d/t waiting [employer] determination. Spoke with Maria L Quintanilla CM and Deanne (Director of CM) requesting ARU be notified if they receive any information regarding response to ARU cost estimate.

## 2022-08-29 NOTE — CARE COORDINATION
Sent clinicals and ARU cost estimate to [employer] via secure email and other information requested. Awaiting response back.

## 2022-08-30 LAB
ANION GAP SERPL CALCULATED.3IONS-SCNC: 9 MMOL/L (ref 4–16)
BASOPHILS ABSOLUTE: 0 K/CU MM
BASOPHILS RELATIVE PERCENT: 0.4 % (ref 0–1)
BUN BLDV-MCNC: 29 MG/DL (ref 6–23)
CALCIUM SERPL-MCNC: 9.9 MG/DL (ref 8.3–10.6)
CHLORIDE BLD-SCNC: 97 MMOL/L (ref 99–110)
CO2: 25 MMOL/L (ref 21–32)
CREAT SERPL-MCNC: 0.8 MG/DL (ref 0.6–1.1)
DIFFERENTIAL TYPE: ABNORMAL
EOSINOPHILS ABSOLUTE: 0.2 K/CU MM
EOSINOPHILS RELATIVE PERCENT: 2.4 % (ref 0–3)
GFR AFRICAN AMERICAN: >60 ML/MIN/1.73M2
GFR NON-AFRICAN AMERICAN: >60 ML/MIN/1.73M2
GLUCOSE BLD-MCNC: 130 MG/DL (ref 70–99)
GLUCOSE BLD-MCNC: 159 MG/DL (ref 70–99)
GLUCOSE BLD-MCNC: 164 MG/DL (ref 70–99)
GLUCOSE BLD-MCNC: 234 MG/DL (ref 70–99)
GLUCOSE BLD-MCNC: 288 MG/DL (ref 70–99)
GLUCOSE BLD-MCNC: 297 MG/DL (ref 70–99)
HCT VFR BLD CALC: 24.1 % (ref 37–47)
HEMOGLOBIN: 7.2 GM/DL (ref 12.5–16)
IMMATURE NEUTROPHIL %: 0.7 % (ref 0–0.43)
LYMPHOCYTES ABSOLUTE: 1.7 K/CU MM
LYMPHOCYTES RELATIVE PERCENT: 18.2 % (ref 24–44)
MCH RBC QN AUTO: 28.5 PG (ref 27–31)
MCHC RBC AUTO-ENTMCNC: 29.9 % (ref 32–36)
MCV RBC AUTO: 95.3 FL (ref 78–100)
MONOCYTES ABSOLUTE: 0.9 K/CU MM
MONOCYTES RELATIVE PERCENT: 9.9 % (ref 0–4)
NUCLEATED RBC %: 0.3 %
PDW BLD-RTO: 18.9 % (ref 11.7–14.9)
PLATELET # BLD: 423 K/CU MM (ref 140–440)
PMV BLD AUTO: 9.1 FL (ref 7.5–11.1)
POTASSIUM SERPL-SCNC: 4.5 MMOL/L (ref 3.5–5.1)
RBC # BLD: 2.53 M/CU MM (ref 4.2–5.4)
SEGMENTED NEUTROPHILS ABSOLUTE COUNT: 6.4 K/CU MM
SEGMENTED NEUTROPHILS RELATIVE PERCENT: 68.4 % (ref 36–66)
SODIUM BLD-SCNC: 131 MMOL/L (ref 135–145)
TOTAL IMMATURE NEUTOROPHIL: 0.07 K/CU MM
TOTAL NUCLEATED RBC: 0 K/CU MM
WBC # BLD: 9.3 K/CU MM (ref 4–10.5)

## 2022-08-30 PROCEDURE — 97530 THERAPEUTIC ACTIVITIES: CPT

## 2022-08-30 PROCEDURE — 80048 BASIC METABOLIC PNL TOTAL CA: CPT

## 2022-08-30 PROCEDURE — 6370000000 HC RX 637 (ALT 250 FOR IP): Performed by: STUDENT IN AN ORGANIZED HEALTH CARE EDUCATION/TRAINING PROGRAM

## 2022-08-30 PROCEDURE — 97116 GAIT TRAINING THERAPY: CPT

## 2022-08-30 PROCEDURE — 1200000000 HC SEMI PRIVATE

## 2022-08-30 PROCEDURE — 94761 N-INVAS EAR/PLS OXIMETRY MLT: CPT

## 2022-08-30 PROCEDURE — 2580000003 HC RX 258: Performed by: ORTHOPAEDIC SURGERY

## 2022-08-30 PROCEDURE — 97110 THERAPEUTIC EXERCISES: CPT

## 2022-08-30 PROCEDURE — 6360000002 HC RX W HCPCS: Performed by: ORTHOPAEDIC SURGERY

## 2022-08-30 PROCEDURE — 6370000000 HC RX 637 (ALT 250 FOR IP): Performed by: HOSPITALIST

## 2022-08-30 PROCEDURE — 85025 COMPLETE CBC W/AUTO DIFF WBC: CPT

## 2022-08-30 PROCEDURE — 6370000000 HC RX 637 (ALT 250 FOR IP): Performed by: ORTHOPAEDIC SURGERY

## 2022-08-30 PROCEDURE — 82962 GLUCOSE BLOOD TEST: CPT

## 2022-08-30 PROCEDURE — 2580000003 HC RX 258: Performed by: ANESTHESIOLOGY

## 2022-08-30 RX ADMIN — ACETAMINOPHEN 650 MG: 325 TABLET ORAL at 06:04

## 2022-08-30 RX ADMIN — INSULIN LISPRO 8 UNITS: 100 INJECTION, SOLUTION INTRAVENOUS; SUBCUTANEOUS at 16:29

## 2022-08-30 RX ADMIN — OXYCODONE HYDROCHLORIDE 5 MG: 5 TABLET ORAL at 11:14

## 2022-08-30 RX ADMIN — INSULIN GLARGINE 38 UNITS: 100 INJECTION, SOLUTION SUBCUTANEOUS at 20:19

## 2022-08-30 RX ADMIN — ACETAMINOPHEN 650 MG: 325 TABLET ORAL at 16:31

## 2022-08-30 RX ADMIN — ENOXAPARIN SODIUM 30 MG: 100 INJECTION SUBCUTANEOUS at 10:16

## 2022-08-30 RX ADMIN — METOPROLOL SUCCINATE 25 MG: 25 TABLET, EXTENDED RELEASE ORAL at 17:32

## 2022-08-30 RX ADMIN — LISINOPRIL 5 MG: 5 TABLET ORAL at 10:13

## 2022-08-30 RX ADMIN — INSULIN LISPRO 8 UNITS: 100 INJECTION, SOLUTION INTRAVENOUS; SUBCUTANEOUS at 16:30

## 2022-08-30 RX ADMIN — ACETAMINOPHEN 650 MG: 325 TABLET ORAL at 11:14

## 2022-08-30 RX ADMIN — Medication 10 ML: at 10:14

## 2022-08-30 RX ADMIN — ENOXAPARIN SODIUM 30 MG: 100 INJECTION SUBCUTANEOUS at 20:20

## 2022-08-30 RX ADMIN — INSULIN LISPRO 8 UNITS: 100 INJECTION, SOLUTION INTRAVENOUS; SUBCUTANEOUS at 20:19

## 2022-08-30 RX ADMIN — Medication 10 ML: at 20:21

## 2022-08-30 RX ADMIN — SODIUM CHLORIDE, PRESERVATIVE FREE 10 ML: 5 INJECTION INTRAVENOUS at 10:13

## 2022-08-30 RX ADMIN — BISACODYL 5 MG: 5 TABLET, COATED ORAL at 10:13

## 2022-08-30 RX ADMIN — AMLODIPINE BESYLATE 5 MG: 5 TABLET ORAL at 10:13

## 2022-08-30 RX ADMIN — SODIUM CHLORIDE, PRESERVATIVE FREE 10 ML: 5 INJECTION INTRAVENOUS at 20:21

## 2022-08-30 ASSESSMENT — PAIN DESCRIPTION - ORIENTATION: ORIENTATION: RIGHT;LEFT

## 2022-08-30 ASSESSMENT — PAIN SCALES - GENERAL: PAINLEVEL_OUTOF10: 5

## 2022-08-30 ASSESSMENT — PAIN DESCRIPTION - DESCRIPTORS: DESCRIPTORS: THROBBING

## 2022-08-30 ASSESSMENT — PAIN DESCRIPTION - LOCATION: LOCATION: LEG

## 2022-08-30 NOTE — PROGRESS NOTES
Comprehensive Nutrition Assessment    Type and Reason for Visit:  Reassess    Nutrition Recommendations/Plan:   Continue diet and supplements as ordered  Encourage and record meal intakes TID      Malnutrition Assessment:  Malnutrition Status: At risk for malnutrition (Comment) (08/26/22 1611)    Context:  Acute Illness     Findings of the 6 clinical characteristics of malnutrition:  Energy Intake:  75% or less of estimated energy requirements for 7 or more days    Nutrition Assessment:    D/w RN, pt is eating meals with greater than 75% intake of lunch today. Dtr helped order meal. Continues with nutrition supplement TID. Will continue to monitor at moderate nutrition risk. Nutrition Related Findings:    Highest glucose 293 over last 24 hours, seeing some improvement. no updated h/h today. Wound Type: Surgical Incision       Current Nutrition Intake & Therapies:    Average Meal Intake: %, Unable to assess  Average Supplements Intake: %  ADULT DIET; Regular; 3 carb choices (45 gm/meal); no pork products, no gelatin  ADULT ORAL NUTRITION SUPPLEMENT; Breakfast, Lunch, Dinner; Diabetic Oral Supplement    Anthropometric Measures:  Height: 5' 2\" (157.5 cm)  Ideal Body Weight (IBW): 110 lbs (50 kg)    Admission Body Weight: 153 lb 10.6 oz (69.7 kg)  Current Body Weight: 160 lb 15 oz (73 kg), 146.3 % IBW. Weight Source: Bed Scale  Current BMI (kg/m2): 29.4  Usual Body Weight:  (unknown. no report of weight loss PTA)                       BMI Categories: Overweight (BMI 25.0-29. 9)    Estimated Daily Nutrient Needs:  Energy Requirements Based On: Formula  Weight Used for Energy Requirements: Current  Energy (kcal/day): 0531-0769  Weight Used for Protein Requirements: Ideal  Protein (g/day): 60-75  Method Used for Fluid Requirements: 1 ml/kcal  Fluid (ml/day): 9374-4789    Nutrition Diagnosis:   Increased nutrient needs related to acute injury/trauma as evidenced by wounds    Nutrition Interventions:   Food and/or Nutrient Delivery: Continue Current Diet, Continue Oral Nutrition Supplement  Nutrition Education/Counseling: No recommendation at this time  Coordination of Nutrition Care: Continue to monitor while inpatient       Goals:  Previous Goal Met: Progressing toward Goal(s)  Goals: Meet at least 75% of estimated needs, by next RD assessment       Nutrition Monitoring and Evaluation:   Behavioral-Environmental Outcomes: None Identified  Food/Nutrient Intake Outcomes: Food and Nutrient Intake, Supplement Intake  Physical Signs/Symptoms Outcomes: Skin    Discharge Planning:    No discharge needs at this time     Saritha Garcia RD, LD  Contact: [phone]

## 2022-08-30 NOTE — PROGRESS NOTES
[facility] HOSPITALIST PROGRESS NOTE      PCP: Velvet Mckeon MD    Date of Admission: 8/20/2022    Subjective: does not feel well   Feels her leg is tense   Does not want anxiety medication at this time     Brief Hospital summary patient is an 23-year-old female with history of hypertension, diabetes who was admitted on 8/21 status post fall. X-ray left hip showed fracture of surgical neck of femur with greater trochanter involvement. Underwent open reduction internal fixation on 8/21. PT OT consulted, recommended SNF placement. Postoperatively hemoglobin dropped to 7.2,  Positive for COVID-19, no steroids indicated. Has remained stable on room air  8/27 hyponatremic, corrected sodium 129, IV fluids   started    Vitals signs: Afebrile, heart rate 90s range, blood pressure 129/43, on room air    Medications: Tylenol, amlodipine, Lovenox, Lantus, sliding scale insulin, lisinopril, metoprolol    Antibiotics: None    Fluid status: 300 cc    Labs:   Labs from yesterday     Imaging: X-ray left hip  Impression:          Fracture of the lateral left femoral neck at the junction with the   intratrochanteric region of the left proximal femur which is not definitively   involved. There is varus angulation of the fracture. Femoral head to   acetabular alignment remains intact.         Assessment/Plan:     Acute left femur neck fracture status post fall:   Status post open reduction internal fixation  PT OT consulted, awaiting rehab placement  Pain well controlled    Hyponatremia: Sodium 125 on 8/27,   Encourage p.o. intake      ABLA: Postoperative blood loss  Add ferrous sulfate  Transfuse as needed as needed    Diabetes mellitus type 2: Sliding scale insulin, diabetic diet  Glucose 207-293 range, increase Lantus to 38 units nightly    Essential hypertension: Amlodipine, lisinopril, Toprol, as needed hydralazine    COVID-19: Upper respiratory tract infection  Incidental finding, asymptomatic, CRP trending downwards  Continue droplet and contact precautions    DVT prophlaxis:   Lovenox    Last BM:   8/26    Ambulation:   PT OT consulted    Disposition:   Awaiting SNF placement, have to wait till 8/31 to go to ARU due to her COVID status    Diet: Diabetic diet    Physical Exam Performed:       BP (!) 129/43   Pulse 97   Temp 98.1 °F (36.7 °C) (Oral)   Resp 15   Ht 5' 2\" (1.575 m)   Wt 163 lb 12.8 oz (74.3 kg)   SpO2 95%   BMI 29.96 kg/m²     Physical Exam  Constitutional:       General: She is not in acute distress. Appearance: Normal appearance. HENT:      Head: Normocephalic and atraumatic. Right Ear: External ear normal.      Left Ear: External ear normal.   Eyes:      Extraocular Movements: Extraocular movements intact. Pupils: Pupils are equal, round, and reactive to light. Cardiovascular:      Rate and Rhythm: Normal rate and regular rhythm. Heart sounds: No murmur heard. Pulmonary:      Effort: Pulmonary effort is normal. No respiratory distress. Breath sounds: Normal breath sounds. No wheezing. Abdominal:      General: Bowel sounds are normal. There is no distension. Palpations: Abdomen is soft. Tenderness: There is no abdominal tenderness. Musculoskeletal:         General: No swelling. Cervical back: Normal range of motion. Comments: Left hip dressing in place   Skin:     General: Skin is warm. Neurological:      General: No focal deficit present. Mental Status: She is alert and oriented to person, place, and time. Cranial Nerves: No cranial nerve deficit.    Psychiatric:         Mood and Affect: Mood normal.     Labs:   Recent Labs     08/27/22  1100   WBC 10.4   HGB 7.5*   HCT 24.3*          Recent Labs     08/27/22  1100 08/28/22  1517 08/29/22  0610   * 132* 136   K 4.3 4.0 4.2   CL 91* 97* 103   CO2 23 26 27   BUN 24* 28* 19   CREATININE 0.6 0.6 0.6   CALCIUM 10.2 10.0 10.0       No results for input(s): AST, ALT, BILIDIR, BILITOT, ALKPHOS in the last 72 hours. No results for input(s): INR in the last 72 hours. No results for input(s): Es [place] in the last 72 hours. Urinalysis:    No results found for: [place]ar [place], BACTERIA, RBCUA, BLOODU, SPECGRAV, GLUCOSEU    Radiology:  XR CHEST PORTABLE   Final Result   Near complete resolution bilateral hilar and suprahilar infiltrates   consistent with improving pneumonia. Otherwise, radiographically nonacute   portable chest.         FL LESS THAN 1 HOUR   Final Result   Intraprocedural fluoroscopic spot images as above. See separate procedure   report for more information. XR HIP 2-3 VW W PELVIS LEFT   Final Result      Fracture of the lateral left femoral neck at the junction with the   intratrochanteric region of the left proximal femur which is not definitively   involved. There is varus angulation of the fracture. Femoral head to   acetabular alignment remains intact. XR CHEST PORTABLE   Final Result   Mild hazy airspace opacities to bilateral upper lung zones, nonspecific but   would be compatible with COVID-19 pneumonia given the clinical history.                  Izabel Fields MD  8/30/2022 6:45 AM

## 2022-08-30 NOTE — PROGRESS NOTES
Physical Therapy  Name: Mercedez Hassan MRN: [id number] :   1940   Date:  2022   Admission Date: 2022 Room:  -A   Restrictions/Precautions:        hip surgery (Left, 2022) WBAT LLE, droplet plus  Communication with other providers:  attempted this am, pt had not had any pain medicine and the nurse was bringing it in at 1100. Pt agreeable to therapy at 1300  Subjective:  Patient states:  I am ready for therapy  Pain:   Location, Type, Intensity (0/10 to 10/10):  L hip with movement 6/10  Objective:    Observation:  pt was sitting up in the chair,   Treatment, including education/measures:  Sitting Exercises: Ankle pumps x 10  Glute sets x 10  LAQ's x 10  Marching x 10  pt has full ROM on the R LE but only can lift her L LE 1/2 inch off the ground  Therapeutic Exercise:  Therapeutic exercises were instructed today. Cues were given for technique, safety, recruitment, and rationale. Cues were verbal and/or tactile. Transfers with line management of tele  Sit to stand :min A of 1, from chair,  for 4 trans  Stand to sit :min A of 1 to chair with max cues for hand placements for 4 trans  Gait:  Pt amb with RW for 2 steps with mod A of 1 with her daughter following with a chair  Gait Comments: with VC's' and TC's for B LE placement, walker placement and sequence throughout ambulation; with VC's and TC's to maintain upright posture in order to avoid COM shifting outside of FARA; with VC's for PLB throughout ambulation  Pt states it is too painful to step on the L LE. Agreeable to standing and weight shifting in place with support of the RW. Pt was able to do 5 min x 2 with attempting to lift the R LE and trust the L LE. Pt needed rest breaks d/t pain in the R shoulder from previous injury. Pt is not able to take steps without A of 2 people. Pt's daughter will be caregiver at home and will have some help at home but not all day.   Therapeutic Exercise:  Therapeutic exercises were instructed today.  Cues were given for technique, safety, recruitment, and rationale. Cues were verbal and/or tactile. Safety  Patient left safely in the chair, with call light/phone in reach. Gait belt were used for transfers and gait. Assessment / Impression:     Patient's tolerance of treatment:  fair, pt is giving up d/t insurance not responding  and rehab being so difficult d/t pain. Pt could really benefit from ARU d/t to her needs, pt is not ready to go home and she is making improvements daily.   Adverse Reaction: none  Significant change in status and impact:  able to trans with min A of 1 and attempt steps  Barriers to improvement:  pain, weakness in the L LE and weakness on the R UE  Plan for Next Session:    Will cont to work towards pt's goals per her tolerance  Time in:  1300  Time out:  1400  Timed treatment minutes:  60  Total treatment time:  61  Previously filed items:  Social/Functional History  Lives With: Daughter  Type of Home: House  Home Layout: Two level, Bed/Bath upstairs  Home Access: Stairs to enter without rails  Entrance Stairs - Number of Steps: 3  Entrance Stairs - Rails: None  Bathroom Shower/Tub: Tub/Shower unit, Walk-in shower  Bathroom Toilet: Standard  Bathroom Accessibility: Accessible  Has the patient had two or more falls in the past year or any fall with injury in the past year?: No  ADL Assistance: [address] [place] [address] Avenue: Independent  Homemaking Responsibilities: Yes  Ambulation Assistance: Independent (uses no AD)  Transfer Assistance: Independent  Active : No  Occupation: Retired  Additional Comments: Pt is visiting her daughter for 6 months from [address] [place] [address] for Short term goals: 2 weeks  Short term goal 1: Pt will perform sit><supine modA  Short term goal 2: Pt will transfer sit><stand Darell  Short term goal 3: Pt will transfer between surfaces Darell  Short term goal 4: Pt will ambulate 30ft with RW Darell     Electronically signed by: Osiris Stringer PTA  8/30/2022, 1:55 PM

## 2022-08-30 NOTE — PROGRESS NOTES
Pt request pure wick for HS, risk and benefits explained on mobility. Insists on pure wick for HS. Voices she will use bedside tomorrow.

## 2022-08-30 NOTE — CARE COORDINATION
Received call from Jaqueline Randolph RN manager of 2E. She had called Dr Adrienne Bernardo due to LOS being greater than expected. CM called Dr Adrienne Brenardo and discussed equipment ordered for pt as requested by her daughter, barriers to discharge such as daughter requesting level of care not covered by the pt's insurance. All requested documentation has been emailed confidentially to [employer]. There has been no response from them up to this time. The situation was also discussed with Dr. Hailey Izaguirre this morning.

## 2022-08-31 VITALS
BODY MASS INDEX: 30.14 KG/M2 | HEIGHT: 62 IN | RESPIRATION RATE: 15 BRPM | OXYGEN SATURATION: 98 % | DIASTOLIC BLOOD PRESSURE: 67 MMHG | SYSTOLIC BLOOD PRESSURE: 99 MMHG | TEMPERATURE: 98 F | WEIGHT: 163.8 LBS | HEART RATE: 98 BPM

## 2022-08-31 LAB
GLUCOSE BLD-MCNC: 138 MG/DL (ref 70–99)
GLUCOSE BLD-MCNC: 148 MG/DL (ref 70–99)
GLUCOSE BLD-MCNC: 276 MG/DL (ref 70–99)
GLUCOSE BLD-MCNC: 95 MG/DL (ref 70–99)
HCT VFR BLD CALC: 24.3 % (ref 37–47)
HEMOGLOBIN: 7.2 GM/DL (ref 12.5–16)
MCH RBC QN AUTO: 28.2 PG (ref 27–31)
MCHC RBC AUTO-ENTMCNC: 29.6 % (ref 32–36)
MCV RBC AUTO: 95.3 FL (ref 78–100)
PDW BLD-RTO: 19 % (ref 11.7–14.9)
PLATELET # BLD: 414 K/CU MM (ref 140–440)
PMV BLD AUTO: 8.8 FL (ref 7.5–11.1)
RBC # BLD: 2.55 M/CU MM (ref 4.2–5.4)
WBC # BLD: 7.8 K/CU MM (ref 4–10.5)

## 2022-08-31 PROCEDURE — 94761 N-INVAS EAR/PLS OXIMETRY MLT: CPT

## 2022-08-31 PROCEDURE — 6370000000 HC RX 637 (ALT 250 FOR IP): Performed by: ORTHOPAEDIC SURGERY

## 2022-08-31 PROCEDURE — 97116 GAIT TRAINING THERAPY: CPT

## 2022-08-31 PROCEDURE — 97530 THERAPEUTIC ACTIVITIES: CPT

## 2022-08-31 PROCEDURE — 94010 BREATHING CAPACITY TEST: CPT

## 2022-08-31 PROCEDURE — 6360000002 HC RX W HCPCS: Performed by: ORTHOPAEDIC SURGERY

## 2022-08-31 PROCEDURE — 82962 GLUCOSE BLOOD TEST: CPT

## 2022-08-31 PROCEDURE — 97535 SELF CARE MNGMENT TRAINING: CPT

## 2022-08-31 PROCEDURE — 85027 COMPLETE CBC AUTOMATED: CPT

## 2022-08-31 PROCEDURE — 2580000003 HC RX 258: Performed by: ANESTHESIOLOGY

## 2022-08-31 PROCEDURE — 6370000000 HC RX 637 (ALT 250 FOR IP): Performed by: STUDENT IN AN ORGANIZED HEALTH CARE EDUCATION/TRAINING PROGRAM

## 2022-08-31 PROCEDURE — 2580000003 HC RX 258: Performed by: ORTHOPAEDIC SURGERY

## 2022-08-31 PROCEDURE — 36415 COLL VENOUS BLD VENIPUNCTURE: CPT

## 2022-08-31 RX ORDER — OXYCODONE HYDROCHLORIDE 5 MG/1
5 TABLET ORAL EVERY 8 HOURS PRN
Qty: 10 TABLET | Refills: 0 | Status: SHIPPED | OUTPATIENT
Start: 2022-08-31 | End: 2022-09-03

## 2022-08-31 RX ORDER — OXYCODONE HYDROCHLORIDE 5 MG/1
5 TABLET ORAL EVERY 8 HOURS PRN
Qty: 10 TABLET | Refills: 0 | Status: SHIPPED | OUTPATIENT
Start: 2022-08-31 | End: 2022-08-31 | Stop reason: SDUPTHER

## 2022-08-31 RX ORDER — AMLODIPINE BESYLATE 5 MG/1
5 TABLET ORAL DAILY
Qty: 30 TABLET | Refills: 1 | Status: SHIPPED | OUTPATIENT
Start: 2022-08-31

## 2022-08-31 RX ORDER — LISINOPRIL 2.5 MG/1
5 TABLET ORAL DAILY
Qty: 30 TABLET | Refills: 1 | Status: SHIPPED | OUTPATIENT
Start: 2022-08-31

## 2022-08-31 RX ADMIN — AMLODIPINE BESYLATE 5 MG: 5 TABLET ORAL at 08:41

## 2022-08-31 RX ADMIN — SODIUM CHLORIDE, PRESERVATIVE FREE 10 ML: 5 INJECTION INTRAVENOUS at 08:44

## 2022-08-31 RX ADMIN — ACETAMINOPHEN 650 MG: 325 TABLET ORAL at 11:07

## 2022-08-31 RX ADMIN — Medication 10 ML: at 08:42

## 2022-08-31 RX ADMIN — LISINOPRIL 5 MG: 5 TABLET ORAL at 08:41

## 2022-08-31 RX ADMIN — ACETAMINOPHEN 650 MG: 325 TABLET ORAL at 05:37

## 2022-08-31 RX ADMIN — INSULIN LISPRO 8 UNITS: 100 INJECTION, SOLUTION INTRAVENOUS; SUBCUTANEOUS at 08:36

## 2022-08-31 RX ADMIN — BISACODYL 5 MG: 5 TABLET, COATED ORAL at 08:44

## 2022-08-31 RX ADMIN — ENOXAPARIN SODIUM 30 MG: 100 INJECTION SUBCUTANEOUS at 08:43

## 2022-08-31 ASSESSMENT — PAIN SCALES - GENERAL
PAINLEVEL_OUTOF10: 3
PAINLEVEL_OUTOF10: 0

## 2022-08-31 ASSESSMENT — PAIN DESCRIPTION - LOCATION: LOCATION: LEG

## 2022-08-31 ASSESSMENT — PAIN DESCRIPTION - ORIENTATION: ORIENTATION: RIGHT

## 2022-08-31 NOTE — CARE COORDINATION
Reached out to [employer] for determination on ARU admission request.  Will follow for response back.

## 2022-08-31 NOTE — PROGRESS NOTES
Physical Therapy  CM notified therapy that pt and daughter requesting more HEP exercises for progression of recovery. Two HEP created and made for pt. One for immediately after discharge and one for progression further out. Each HEP explained to pt and daughter. Both verbalized understanding. Pt and daughter educated on use of gait belt, importance of exercise, importance of ambulation, and general safety. All questions answered.    Time in: 1512  Time out: 1522

## 2022-08-31 NOTE — CARE COORDINATION
Reviewed chart and discussed in IDR, plan is discharge today. Spoke with [employer] is not getting back with her about if pt could come or not. Called room and spoke with pt's daughter, plan is home today, with family. Daughter asking about RW and wanting instructions on how to take care of pt. Message to [place] at [facility], they have voulcher from [employer] and nayelyiy can  anytime    1250 Spoke with pt's daughter again, family can  RW. Vm left for Freddy RAZA to discuss hand outs for family to work on therapy at home. Attempted to call nurse Peter x 2 with not answer, will reach out to Charge nurse. 600 Jeniffer Oliveira with Peter and she is working with daughter on pt's care. Awaiting callback from [facility]. Also will give daughter local [facility] [employer]. list per her requesting understands most likely private pay. PS to [facility] with [facility] to see pt to answer daughter's questions. [address] spoke with family , not wanting HC at this time d/t cost. Qi Tobias stopped by therapy dept and they will bring exercise info info pt/daughter.

## 2022-08-31 NOTE — DISCHARGE SUMMARY
V2.0  Discharge Summary    Name:  Dustin Spain /Age/Sex: 1940 (81 y.o. female)   Admit Date: 2022  Discharge Date: 22    MRN & CSN:  [id number] & [id number] Encounter Date and Time 22 2:47 PM EDT    Attending:  Pam Cunningham MD Discharging Provider: Pam Cunningham MD       Hospital Course:     Brief HPI: Dustin Spain is a 81 y.o. female with past medical history of essential hypertension, diabetes who presented to the emergency department at [facility] after mechanical fall. She reportedly was going to the bathroom this morning at 2 around 2 AM she fell. She hit her head but did not not lose consciousness. She denies any blood thinners. She denies neck pain. .  Reports left hip pain. Denies nausea vomiting. Denies lightheadedness or dizziness. Lab work-up at [facility] ED was remarkable for a glucose of 374 sodium 133 hemoglobin 9 point select with hematocrit of 28.5. Rapid COVID was also found to be positive. X-ray at [facility] showed a fracture of the left surgical neck of the femur with extension into the greater trochanter and mild shortening. CT head without contrast in [facility] ED was negative for acute intracranial findings    Brief Problem Based Course:     Acute left femur neck fracture status post fall:   -Status post open reduction internal fixation  -Pain well controlled. Discharged on oxycodone for 3 days and encouraged to only use it prior to therapy. Discussed with daughter. -Given lower extremity surgery and COVID-19 infection patient was discharged on 30 days of Eliquis for DVT prophylaxis. Hyponatremia: Corrected appropriately with improvement in p.o. intake. ABLA: Postoperative blood loss  Hemoglobin stable on subsequent checks. Given iron sulfate while she was here. Diabetes mellitus type 2: Sliding scale insulin, Lantus while she was here. Resume home oral diabetes meds on discharge. Essential hypertension: Managed with home medications. COVID-19: Upper respiratory tract infection  Incidental finding, asymptomatic, CRP trending downwards not require any oxygen. The patient expressed appropriate understanding of, and agreement with the discharge recommendations, medications, and plan. Consults this admission:  IP CONSULT TO ORTHOPEDIC SURGERY  IP CONSULT TO PULMONOLOGY  IP CONSULT TO ORTHOPEDIC SURGERY  IP CONSULT TO PHARMACY  IP CONSULT TO CASE MANAGEMENT  IP CONSULT TO PHARMACY    Discharge Diagnosis:   Closed displaced fracture of left femoral neck (HCC)  Acute left femur neck fracture status post fall:   Hyponatremia  Acute Blood Loss Anemia  Diabetes mellitus type 2  Essential hypertension  COVID-19  Discharge Instruction:   Follow up appointments: Orthopedic surgeon Dr. Padmini Downs in 1 week to remove staples  Primary care physician: Abdiel Moser MD within 2 weeks  Diet: diabetic diet   Activity: activity as tolerated  Disposition: Discharged to:   [x]Home, [][facility], []SNF, []Acute Rehab, []Hospice   Condition on discharge: Stable  Labs and Tests to be Followed up as an outpatient by PCP or Specialist: Orthopedics. Plan to remove staples in 1 week at office appointment. Discharge Medications:        Medication List        START taking these medications      apixaban 2.5 MG Tabs tablet  Commonly known as: Eliquis  Take 1 tablet by mouth 2 times daily     oxyCODONE 5 MG immediate release tablet  Commonly known as: ROXICODONE  Take 1 tablet by mouth every 8 hours as needed for Pain (use prior to therapy.) for up to 3 days.             CHANGE how you take these medications      amLODIPine 5 MG tablet  Commonly known as: NORVASC  Take 1 tablet by mouth daily  What changed: how much to take     lisinopril 2.5 MG tablet  Commonly known as: PRINIVIL;ZESTRIL  Take 2 tablets by mouth daily  What changed: when to take this            CONTINUE taking these medications      glipiZIDE 10 MG tablet  Commonly known as: GLUCOTROL     metFORMIN 500 MG tablet  Commonly known as: GLUCOPHAGE     metoprolol succinate 25 MG extended release tablet  Commonly known as: TOPROL XL     SITagliptin 50 MG tablet  Commonly known as: Jessenia Candelario               Where to Get Your Medications        These medications were sent to [facility] [facility]      Phone: [phone]   amLODIPine 5 MG tablet  apixaban 2.5 MG Tabs tablet  lisinopril 2.5 MG tablet       You can get these medications from any pharmacy    Bring a paper prescription for each of these medications  oxyCODONE 5 MG immediate release tablet        Objective Findings at Discharge:   BP 99/67   Pulse 98   Temp 98 °F (36.7 °C) (Oral)   Resp 15   Ht 5' 2\" (1.575 m)   Wt 163 lb 12.8 oz (74.3 kg)   SpO2 98%   BMI 29.96 kg/m²       Physical Exam:   General: NAD  Eyes: EOMI  ENT: neck supple  Cardiovascular: Regular rate. Regular rhythm normal S1-S2  Respiratory: Clear to auscultation. No acute respite distress  Gastrointestinal: Soft, non tender  Genitourinary: no suprapubic tenderness  Musculoskeletal: No edema  Skin: warm, dry. Incision site covered in bandage. Clean and dry. Neuro: Alert, oriented x3. Left lower extremity range of motion restricted due to pain in the left hip. Strength 5 out of 5 in all other extremities. Bilateral tremor at rest.  Psych: Mood appropriate. Labs and Imaging   No results found.     CBC:   Recent Labs     08/30/22  1637 08/31/22  0552   WBC 9.3 7.8   HGB 7.2* 7.2*    414     BMP:    Recent Labs     08/28/22  1517 08/29/22  0610 08/30/22  1637   * 136 131*   K 4.0 4.2 4.5   CL 97* 103 97*   CO2 26 27 25   BUN 28* 19 29*   CREATININE 0.6 0.6 0.8   GLUCOSE 133* 161* 288*       Hemoglobin A1C:   Lab Results   Component Value Date/Time    LABA1C 7.7 08/20/2022 09:33 PM         Time Spent Discharging patient 35

## 2022-08-31 NOTE — PROGRESS NOTES
Outpatient Pharmacy Progress Note for Meds-to-Beds    Total number of Prescriptions Filled: 1  The following medications were dispensed to the patient during the discharge process:  Apixaban    Additional Documentation:  Patient's family member picked-up the medication(s) in the [address] [place] [address] Road stated patient has other Rx's at home and do not need them at this time. Thank you for letting us serve your patients.   [address] [facility]    [address] [facility]    Phone: [phone]    Fax: [phone]

## 2022-08-31 NOTE — PROGRESS NOTES
Physical Therapy  Name: Chan Reed MRN: [id number] :   1940   Date:  2022   Admission Date: 2022 Room:  -A   Restrictions/Precautions:         hip surgery (Left, 2022) WBAT LLE,   Communication with other providers:  pt's daughter, co-treat with BAXTER  Subjective:  Patient states:  I took a couple steps good today  Pain:   Location, Type, Intensity (0/10 to 10/10):  7/10 L hip with activity  Objective:    Observation:  pt was up in the chair  Treatment, including education/measures:  Transfers with line management of tele  Session was used as family instruct  with daughter, answered a lot of questions to build confidence for pt and daughter d/t not hearing from insurance regarding ARU or SNF  Scooting :Ind forward and backward in the chair  Sit to stand :CGA to min a depending on level of fatigue, VC's for hand placements CGA of a second person for safety  Stand to sit :CGA to min a depending on level of fatigue, VC's for hand placements CGA of a second person for safety  Gait:  Pt amb with RW for 3 ft x 2 to /from [facility] with min A of 1 progressing to mod A of 1 on the R side d/t fatigue and pain CGA of a second person for safety,   VC's for Weight shifting  Gait Comments: with VC's' and TC's for B LE placement, walker placement and sequence throughout ambulation; with VC's and TC's to maintain upright posture in order to avoid COM shifting outside of AFRA; with VC's for PLB throughout ambulation  Gait:  Pt amb with CW for 5 ft with min A of 1to mod a of 1 for R side  depending on pain and fatigue,CGA of a second person for safety  Same VC's as above  Sitting Exercises: did with daughter  Safety  Patient left safely in the chair, with call light/phone in reach with alarm applied. Gait belt and mask were used for transfers and gait.   Assessment / Impression:     Patient's tolerance of treatment:  good, pt was able to improve with taking steps d/t confidence with CW, daughter feels confident that she can help her mom with toileting, trans to BSC and gt in small distance like bed to chair   Adverse Reaction: none  Significant change in status and impact:  none  Barriers to improvement:  fatigue, pain   Plan for Next Session:    Will cont to work towards pt's goals per her tolerance  Time in:  0925  Time out:  1005  Timed treatment minutes:  40  Total treatment time:  40  Previously filed items:  Social/Functional History  Lives With: Daughter  Type of Home: House  Home Layout: Two level, Bed/Bath upstairs  Home Access: Stairs to enter without rails  Entrance Stairs - Number of Steps: 3  Entrance Stairs - Rails: None  Bathroom Shower/Tub: Tub/Shower unit, Walk-in shower  Bathroom Toilet: Standard  Bathroom Accessibility: Accessible  Has the patient had two or more falls in the past year or any fall with injury in the past year?: No  ADL Assistance: [address] [place] [address] Avenue: Independent  Homemaking Responsibilities: Yes  Ambulation Assistance: Independent (uses no AD)  Transfer Assistance: Independent  Active : No  Occupation: Retired  Additional Comments: Pt is visiting her daughter for 6 months from [address] [place] [address] Short term goals: 2 weeks  Short term goal 1: Pt will perform sit><supine modA  Short term goal 2: Pt will transfer sit><stand Darell  Short term goal 3: Pt will transfer between surfaces Darell  Short term goal 4: Pt will ambulate 30ft with RW Darell     Electronically signed by:     Medhat Patel PTA  8/31/2022, 9:33 AM

## 2022-09-01 ENCOUNTER — CARE COORDINATION (OUTPATIENT)
Dept: CASE MANAGEMENT | Age: 82
End: 2022-09-01

## 2022-09-01 NOTE — CARE COORDINATION
[facility]    Call within 2 business days of discharge: Yes    Patient: Ana Laura Desouza Patient : 1940   MRN: <[id number]>  Reason for Admission:  Left hip fx, COVID positive  Discharge Date: 22 RARS: Readmission Risk Score: 14.2      Last Discharge [facility]       Date Complaint Diagnosis Description Type Department Provider    22  Femoral neck stress fracture, initial encounter Admission (Discharged) Gerson Desir MD             Spoke with: Ceci Avalos, patient's daughter    Scheduled appointment with Specialist-has appt with orthopedic surgeon on 22  Obtained and reviewed discharge summary and/or continuity of care documents  Education of patient/family/caregiver/guardian to support self-management-when to contact providers    Contacted patient for initial [facility] transition follow up. Spoke with patient's daughter Ceci Avalos. She states her mother is doing okay. Reports swelling in left foot. She plans to monitor swelling and if swelling does not decrease will contact her doctor. Dressing dry and intact. Denies having s/s of infection around dressing (no increased redness, swelling, drainage, bleeding, fever, chills). Reports having \"some\" bruising. Patient only getting up to use the [facility]. Reports having pain when moving. She is only taking Tylenol. They have not picked up the Oxycodone. She is urinating w/o issues. Has not moved her bowels today but last bowel movement was yesterday at the hospital.  She is eating but not drinking as much fluids. Daughter is encouraging her mother to drink more fluids. Fasting blood sugar was 173. They are monitoring her vital signs-/68; Pulse 101. She denies having any s/s of COVID. Reports she was asymptomatic. Isolation ended yesterday. Patient is here visiting and staying with her daughter. No [facility] in place d/t having [employer].   She  was given exercise handouts prior to leaving. Richard Paredes states her mother is doing the exercises while lying in bed. Reviewed medication list.  1111F not completed d/t patient having non [facility] provider. Daughter did confirm Dr. João Mas is her doctor. She confirmed her mother is taking the Eliquis. They have not had to  the oxycodone. Also reviewed changed medications. She states they will continue to monitor her BP and HR and will give her medications as ordered. Patient has a follow up with the orthopedic surgeon on Wednesday, 22. She will continue to monitor swelling and well as other signs/symptoms and contact her doctor as needed. No questions or needs at this time. Transitions of Care Initial Call    Was this an external facility discharge? No Discharge Facility: [facility]     Challenges to be reviewed by the provider   Additional needs identified to be addressed with provider: No  none             Method of communication with provider : none    Advance Care Planning:   Does patient have an Advance Directive: not on file. Care Transition Nurse contacted the family by telephone to perform post hospital discharge assessment. Verified name and  with family as identifiers. Provided introduction to self, and explanation of the CTN role. CTN reviewed discharge instructions, medical action plan and red flags with family who verbalized understanding. Family given an opportunity to ask questions and does not have any further questions or concerns at this time. Were discharge instructions available to patient? Yes. Reviewed appropriate site of care based on symptoms and resources available to patient including: PCP  Specialist. The family agrees to contact the PCP office for questions related to their healthcare. Medication reconciliation was performed with family, who verbalizes understanding of administration of home medications. Was patient discharged with a pulse oximeter?  no    CTN provided contact information. Plan for follow-up call in 5-7 days based on severity of symptoms and risk factors. Plan for next call: symptom management-s/s of infection, pain, swelling    Care Transitions 24 Hour Call    Do you have a copy of your discharge instructions?: Yes  Do you have all of your prescriptions and are they filled?: No  Have you been contacted by a [employer] Avenue?: No  Have you scheduled your follow up appointment?: Yes  How are you going to get to your appointment?: Car - family or friend to transport  Do you feel like you have everything you need to keep you well at home?: Yes  Care Transitions Interventions         Follow Up  No future appointments.     Temitope Magana RN  Care Transition Nurse

## 2022-09-08 ENCOUNTER — CARE COORDINATION (OUTPATIENT)
Dept: CASE MANAGEMENT | Age: 82
End: 2022-09-08

## 2022-09-08 NOTE — CARE COORDINATION
[facility] Transitions Follow Up Call    2022    Patient: Gypsy Mar  Patient : 1940   MRN: <[id number]>  Reason for Admission: Left hip fx, COVID positive  Discharge Date: 22 RARS: Readmission Risk Score: 14.2    Attempted to contact patient for COVID transition follow up. Unable to reach patient. Left message with contact information and request for call back. Follow Up  No future appointments.     Annabella Wood RN  Care Transition Nurse

## 2022-09-14 ENCOUNTER — CARE COORDINATION (OUTPATIENT)
Dept: CASE MANAGEMENT | Age: 82
End: 2022-09-14

## 2022-09-14 NOTE — CARE COORDINATION
[place] [address] Transitions Follow Up Call    2022    Patient: Eduardo Decker  Patient : 1940   MRN: <[id number]>  Reason for Admission: Left hip fx, COVID positive  Discharge Date: 22 RARS: Readmission Risk Score: 14.2         Spoke with: Lee Ann Christian, patient's daughter    Contacted patient for final COVID transition follow up. Spoke with patient's daughter Lee Ann Christian. She states that her mother is still not walking as much. Patient started outpatient PT. She's had one session so far. Lee Ann Christian wondering how long it will take for her mother to start walking more. Informed her that it will take some time and not to get discouraged because she's only had one session. She states her mother is not lifting her right leg (non surgery side) when walking. She denies having any pain on that side. She states her mother is able to lift her legs and do the exercises while in bed w/o any complaints. She is also able to elevate both legs w/o difficulties. PT will work with patient twice a week. She denies her mother having c/o pain in left hip although she does c/o pain in left knee. She had follow up with the orthopedic surgeon and \"everything looks fine\". Patient taking one Oxycodone tablet one hour prior to PT. Swelling in right foot resolving. She is wearing compression stockings on occasion. Patient is eating and drinking fluids w/o issues. They continue to monitor her bowels. Lee Ann Christian states they have stool softeners if she has difficulty moving her bowels. Patient does not have any s/s of COVID. She was asymptomatic. No questions or needs at this time. No further outreach. Patient contacted regarding COVID-19 diagnosis. Discussed COVID-19 related testing which was  done at outside facility  at this time. Test results were positive. Patient informed of results, if available? Yes    Care Transition Nurse contacted the family by telephone to perform follow-up assessment.  Verified name and  with family as identifiers. Patient has following risk factors of: DM.    Symptoms reviewed with family who verbalized the following symptoms: no new symptoms  no worsening symptoms. Due to no new or worsening symptoms encounter was not routed to provider for escalation. are. Was patient discharged with a pulse oximeter? no    CTN provided contact information. No further follow-up call identified based on severity of symptoms and risk factors. Care Transitions Subsequent and Final Call    Subsequent and Final Calls  Do you have any questions related to your medications?: No  Do you currently have any active services?: Yes  Are you currently active with any services?: Outpatient/Community Services  Do you have any needs or concerns that I can assist you with?: No  Care Transitions Interventions  Other Interventions: Follow Up  No future appointments.     Carolina Dhillon, RN  Care Transition Nurse

## (undated) DEVICE — COUNTER NDL 30 COUNT FOAM STRP SGL MAG

## (undated) DEVICE — SHEET,DRAPE,53X77,STERILE: Brand: MEDLINE

## (undated) DEVICE — SUTURE PDS II SZ 1 L96IN ABSRB VLT TP-1 L65MM 1/2 CIR Z880G

## (undated) DEVICE — GLOVE SURG SZ 8 L12IN THK75MIL DK GRN LTX FREE

## (undated) DEVICE — 6619 2 PTNT ISO SYS INCISE AREA&LT;(&GT;&&LT;)&GT;P: Brand: STERI-DRAPE™ IOBAN™ 2

## (undated) DEVICE — GLOVE SURG SZ 65 THK91MIL LTX FREE SYN POLYISOPRENE

## (undated) DEVICE — PACK,BASIC,SIRUS,V: Brand: MEDLINE

## (undated) DEVICE — CALIBRATED DRILL BIT , AO: Brand: AXSOS

## (undated) DEVICE — HEWSON SUTURE RETRIEVER: Brand: HEWSON SUTURE RETRIEVER

## (undated) DEVICE — INTENDED FOR TISSUE SEPARATION, AND OTHER PROCEDURES THAT REQUIRE A SHARP SURGICAL BLADE TO PUNCTURE OR CUT.: Brand: BARD-PARKER ® STAINLESS STEEL BLADES

## (undated) DEVICE — SUTURE N ABSRB MONOFILAMENT 2-0 38 IN 39 MM BLU FORC FIBER 3910900022

## (undated) DEVICE — TOWEL,OR,DSP,ST,BLUE,STD,6/PK,12PK/CS: Brand: MEDLINE

## (undated) DEVICE — GLOVE SURG SZ 65 L12IN FNGR THK79MIL GRN LTX FREE

## (undated) DEVICE — C-ARM: Brand: UNBRANDED

## (undated) DEVICE — ROD RMR L950MM DIA3MM W/ STR BALL TIP

## (undated) DEVICE — SUTURE ABSORBABLE BRAIDED 2-0 CT-1 27 IN UD VICRYL J259H

## (undated) DEVICE — PACK PROCEDURE SURG TOT HIP LF

## (undated) DEVICE — DRESSING TRNSPAR W5XL4.5IN FLM SHT SEMIPERMEABLE WIND